# Patient Record
Sex: MALE | Race: WHITE | Employment: OTHER | URBAN - NONMETROPOLITAN AREA
[De-identification: names, ages, dates, MRNs, and addresses within clinical notes are randomized per-mention and may not be internally consistent; named-entity substitution may affect disease eponyms.]

---

## 2017-10-03 ENCOUNTER — DOCTOR'S OFFICE (OUTPATIENT)
Dept: URBAN - NONMETROPOLITAN AREA CLINIC 1 | Facility: CLINIC | Age: 72
Setting detail: OPHTHALMOLOGY
End: 2017-10-03
Payer: COMMERCIAL

## 2017-10-03 ENCOUNTER — RX ONLY (RX ONLY)
Age: 72
End: 2017-10-03

## 2017-10-03 DIAGNOSIS — H25.13: ICD-10-CM

## 2017-10-03 DIAGNOSIS — Z79.84: ICD-10-CM

## 2017-10-03 DIAGNOSIS — E11.3293: ICD-10-CM

## 2017-10-03 PROCEDURE — 92004 COMPRE OPH EXAM NEW PT 1/>: CPT | Performed by: OPHTHALMOLOGY

## 2017-10-03 ASSESSMENT — REFRACTION_MANIFEST
OD_VA3: 20/
OU_VA: 20/
OS_VA2: 20/
OS_VA3: 20/
OS_VA3: 20/
OD_VA1: 20/
OS_VA3: 20/
OD_VA1: 20/
OD_VA2: 20/
OD_VA1: 20/
OU_VA: 20/
OD_VA3: 20/
OD_VA2: 20/
OU_VA: 20/
OS_VA2: 20/
OD_VA2: 20/
OS_VA1: 20/
OS_VA1: 20/
OS_VA2: 20/
OS_VA1: 20/
OD_VA3: 20/

## 2017-10-03 ASSESSMENT — REFRACTION_AUTOREFRACTION
OS_CYLINDER: -0.50
OS_AXIS: 013
OD_AXIS: 114
OD_CYLINDER: -0.50
OS_SPHERE: -4.00
OD_SPHERE: -4.00

## 2017-10-03 ASSESSMENT — SPHEQUIV_DERIVED
OD_SPHEQUIV: -4.25
OS_SPHEQUIV: -4.25

## 2017-10-03 ASSESSMENT — VISUAL ACUITY
OS_BCVA: 20/25+1
OD_BCVA: 20/25-1

## 2017-10-03 ASSESSMENT — REFRACTION_CURRENTRX
OD_OVR_VA: 20/
OS_OVR_VA: 20/
OS_OVR_VA: 20/
OD_OVR_VA: 20/
OD_OVR_VA: 20/
OS_OVR_VA: 20/

## 2017-10-03 ASSESSMENT — CONFRONTATIONAL VISUAL FIELD TEST (CVF)
OS_FINDINGS: FULL
OD_FINDINGS: FULL

## 2017-10-17 ENCOUNTER — DOCTOR'S OFFICE (OUTPATIENT)
Dept: URBAN - NONMETROPOLITAN AREA CLINIC 1 | Facility: CLINIC | Age: 72
Setting detail: OPHTHALMOLOGY
End: 2017-10-17
Payer: COMMERCIAL

## 2017-10-17 ENCOUNTER — DOCTOR'S OFFICE (OUTPATIENT)
Dept: URBAN - NONMETROPOLITAN AREA CLINIC 1 | Facility: CLINIC | Age: 72
Setting detail: OPHTHALMOLOGY
End: 2017-10-17

## 2017-10-17 DIAGNOSIS — H52.4: ICD-10-CM

## 2017-10-17 DIAGNOSIS — H25.11: ICD-10-CM

## 2017-10-17 PROCEDURE — CATARACT K CATARACT KIT: Performed by: OPHTHALMOLOGY

## 2017-10-17 PROCEDURE — 92136 OPHTHALMIC BIOMETRY: CPT | Performed by: OPHTHALMOLOGY

## 2017-11-09 ENCOUNTER — AMBUL SURGICAL CARE (OUTPATIENT)
Dept: URBAN - NONMETROPOLITAN AREA SURGERY 1 | Facility: SURGERY | Age: 72
Setting detail: OPHTHALMOLOGY
End: 2017-11-09
Payer: COMMERCIAL

## 2017-11-09 DIAGNOSIS — H25.11: ICD-10-CM

## 2017-11-09 DIAGNOSIS — H25.011: ICD-10-CM

## 2017-11-09 DIAGNOSIS — H25.031: ICD-10-CM

## 2017-11-09 DIAGNOSIS — H25.041: ICD-10-CM

## 2017-11-09 PROCEDURE — 66984 XCAPSL CTRC RMVL W/O ECP: CPT | Performed by: OPHTHALMOLOGY

## 2017-11-09 PROCEDURE — G8918 PT W/O PREOP ORDER IV AB PRO: HCPCS | Performed by: OPHTHALMOLOGY

## 2017-11-09 PROCEDURE — G8907 PT DOC NO EVENTS ON DISCHARG: HCPCS | Performed by: OPHTHALMOLOGY

## 2017-11-10 ENCOUNTER — DOCTOR'S OFFICE (OUTPATIENT)
Dept: URBAN - NONMETROPOLITAN AREA CLINIC 1 | Facility: CLINIC | Age: 72
Setting detail: OPHTHALMOLOGY
End: 2017-11-10

## 2017-11-10 ENCOUNTER — RX ONLY (RX ONLY)
Age: 72
End: 2017-11-10

## 2017-11-10 DIAGNOSIS — H25.12: ICD-10-CM

## 2017-11-10 DIAGNOSIS — Z96.1: ICD-10-CM

## 2017-11-10 PROCEDURE — 99024 POSTOP FOLLOW-UP VISIT: CPT | Performed by: OPTOMETRIST

## 2017-11-10 ASSESSMENT — REFRACTION_CURRENTRX
OD_OVR_VA: 20/
OD_OVR_VA: 20/
OS_OVR_VA: 20/
OD_OVR_VA: 20/

## 2017-11-10 ASSESSMENT — REFRACTION_MANIFEST
OD_VA3: 20/
OU_VA: 20/
OS_VA1: 20/
OD_VA1: 20/
OD_VA3: 20/
OU_VA: 20/
OS_VA2: 20/
OD_VA1: 20/
OS_VA1: 20/
OS_VA3: 20/
OD_VA3: 20/
OD_VA2: 20/
OS_VA2: 20/
OS_VA2: 20/
OS_VA3: 20/
OS_VA1: 20/
OD_VA2: 20/
OU_VA: 20/
OD_VA2: 20/
OS_VA3: 20/
OD_VA1: 20/

## 2017-11-10 ASSESSMENT — SPHEQUIV_DERIVED
OD_SPHEQUIV: 0.25
OS_SPHEQUIV: -4.25

## 2017-11-10 ASSESSMENT — REFRACTION_AUTOREFRACTION
OS_CYLINDER: -0.50
OS_SPHERE: -4.00
OS_AXIS: 013
OD_SPHERE: +0.75
OD_AXIS: 170
OD_CYLINDER: -1.00

## 2017-11-10 ASSESSMENT — VISUAL ACUITY
OD_BCVA: 20/25+2
OS_BCVA: 20/20-1

## 2017-11-21 ENCOUNTER — DOCTOR'S OFFICE (OUTPATIENT)
Dept: URBAN - NONMETROPOLITAN AREA CLINIC 1 | Facility: CLINIC | Age: 72
Setting detail: OPHTHALMOLOGY
End: 2017-11-21

## 2017-11-21 DIAGNOSIS — Z96.1: ICD-10-CM

## 2017-11-21 DIAGNOSIS — H25.12: ICD-10-CM

## 2017-11-21 PROCEDURE — 99024 POSTOP FOLLOW-UP VISIT: CPT | Performed by: OPHTHALMOLOGY

## 2017-11-21 ASSESSMENT — REFRACTION_CURRENTRX
OD_OVR_VA: 20/
OS_OVR_VA: 20/
OD_OVR_VA: 20/
OD_OVR_VA: 20/
OS_OVR_VA: 20/
OS_OVR_VA: 20/

## 2017-11-21 ASSESSMENT — REFRACTION_MANIFEST
OS_VA3: 20/
OU_VA: 20/
OD_VA1: 20/
OU_VA: 20/
OD_VA3: 20/
OD_VA2: 20/
OU_VA: 20/
OD_VA3: 20/
OS_VA1: 20/
OS_VA3: 20/
OS_VA2: 20/
OD_VA3: 20/
OS_VA3: 20/
OD_VA1: 20/
OS_VA2: 20/
OD_VA1: 20/
OS_VA1: 20/
OD_VA2: 20/
OS_VA2: 20/
OD_VA2: 20/
OS_VA1: 20/

## 2017-11-21 ASSESSMENT — SPHEQUIV_DERIVED
OD_SPHEQUIV: -0.5
OS_SPHEQUIV: -4.875

## 2017-11-21 ASSESSMENT — REFRACTION_AUTOREFRACTION
OS_SPHERE: -4.25
OD_CYLINDER: 0.00
OD_AXIS: 0
OS_CYLINDER: -1.25
OS_AXIS: 37
OD_SPHERE: -0.50

## 2017-11-21 ASSESSMENT — VISUAL ACUITY
OS_BCVA: 20/25-2
OD_BCVA: 20/CF XS 6'

## 2017-11-28 ENCOUNTER — DOCTOR'S OFFICE (OUTPATIENT)
Dept: URBAN - NONMETROPOLITAN AREA CLINIC 1 | Facility: CLINIC | Age: 72
Setting detail: OPHTHALMOLOGY
End: 2017-11-28
Payer: COMMERCIAL

## 2017-11-28 DIAGNOSIS — H25.12: ICD-10-CM

## 2017-11-28 PROCEDURE — 92136 OPHTHALMIC BIOMETRY: CPT | Performed by: OPHTHALMOLOGY

## 2017-11-30 ENCOUNTER — AMBUL SURGICAL CARE (OUTPATIENT)
Dept: URBAN - NONMETROPOLITAN AREA SURGERY 1 | Facility: SURGERY | Age: 72
Setting detail: OPHTHALMOLOGY
End: 2017-11-30
Payer: COMMERCIAL

## 2017-11-30 DIAGNOSIS — H25.12: ICD-10-CM

## 2017-11-30 DIAGNOSIS — H25.042: ICD-10-CM

## 2017-11-30 DIAGNOSIS — H25.012: ICD-10-CM

## 2017-11-30 DIAGNOSIS — H25.032: ICD-10-CM

## 2017-11-30 PROCEDURE — 66984 XCAPSL CTRC RMVL W/O ECP: CPT | Performed by: OPHTHALMOLOGY

## 2017-11-30 PROCEDURE — G8907 PT DOC NO EVENTS ON DISCHARG: HCPCS | Performed by: OPHTHALMOLOGY

## 2017-11-30 PROCEDURE — G8918 PT W/O PREOP ORDER IV AB PRO: HCPCS | Performed by: OPHTHALMOLOGY

## 2017-12-01 ENCOUNTER — RX ONLY (RX ONLY)
Age: 72
End: 2017-12-01

## 2017-12-01 ENCOUNTER — DOCTOR'S OFFICE (OUTPATIENT)
Dept: URBAN - NONMETROPOLITAN AREA CLINIC 1 | Facility: CLINIC | Age: 72
Setting detail: OPHTHALMOLOGY
End: 2017-12-01

## 2017-12-01 DIAGNOSIS — Z96.1: ICD-10-CM

## 2017-12-01 PROBLEM — H25.12 CATARACT NUCLEAR SCLEROSIS AGE RELATED; LEFT EYE: Status: RESOLVED | Noted: 2017-11-10 | Resolved: 2017-12-01

## 2017-12-01 PROCEDURE — 99024 POSTOP FOLLOW-UP VISIT: CPT | Performed by: OPTOMETRIST

## 2017-12-01 ASSESSMENT — REFRACTION_AUTOREFRACTION
OS_AXIS: 151
OD_AXIS: 167
OD_SPHERE: -0.50
OD_CYLINDER: -0.50
OS_SPHERE: -0.25
OS_CYLINDER: -0.50

## 2017-12-01 ASSESSMENT — REFRACTION_MANIFEST
OD_VA1: 20/
OD_VA2: 20/
OS_VA2: 20/
OS_VA2: 20/
OU_VA: 20/
OD_VA3: 20/
OD_VA1: 20/
OS_VA1: 20/
OD_VA3: 20/
OS_VA3: 20/
OD_VA2: 20/
OS_VA3: 20/
OD_VA1: 20/
OD_VA3: 20/
OU_VA: 20/
OU_VA: 20/
OS_VA3: 20/
OD_VA2: 20/
OS_VA1: 20/
OS_VA1: 20/
OS_VA2: 20/

## 2017-12-01 ASSESSMENT — SPHEQUIV_DERIVED
OS_SPHEQUIV: -0.5
OD_SPHEQUIV: -0.75

## 2017-12-01 ASSESSMENT — REFRACTION_CURRENTRX
OD_OVR_VA: 20/
OD_OVR_VA: 20/
OS_OVR_VA: 20/
OD_OVR_VA: 20/

## 2017-12-01 ASSESSMENT — VISUAL ACUITY
OD_BCVA: 20/25-2
OS_BCVA: 20/25-2

## 2017-12-13 ENCOUNTER — DOCTOR'S OFFICE (OUTPATIENT)
Dept: URBAN - NONMETROPOLITAN AREA CLINIC 1 | Facility: CLINIC | Age: 72
Setting detail: OPHTHALMOLOGY
End: 2017-12-13

## 2017-12-13 ENCOUNTER — RX ONLY (RX ONLY)
Age: 72
End: 2017-12-13

## 2017-12-13 DIAGNOSIS — Z96.1: ICD-10-CM

## 2017-12-13 PROCEDURE — 99024 POSTOP FOLLOW-UP VISIT: CPT | Performed by: PHYSICIAN ASSISTANT

## 2018-01-23 ENCOUNTER — RX ONLY (RX ONLY)
Age: 73
End: 2018-01-23

## 2018-01-23 ENCOUNTER — DOCTOR'S OFFICE (OUTPATIENT)
Dept: URBAN - NONMETROPOLITAN AREA CLINIC 1 | Facility: CLINIC | Age: 73
Setting detail: OPHTHALMOLOGY
End: 2018-01-23
Payer: COMMERCIAL

## 2018-01-23 DIAGNOSIS — H04.123: ICD-10-CM

## 2018-01-23 DIAGNOSIS — Z96.1: ICD-10-CM

## 2018-01-23 PROCEDURE — 92015 DETERMINE REFRACTIVE STATE: CPT | Performed by: OPTOMETRIST

## 2018-01-23 PROCEDURE — 83861 MICROFLUID ANALY TEARS: CPT | Performed by: OPTOMETRIST

## 2018-01-23 ASSESSMENT — REFRACTION_MANIFEST
OD_VA2: 20/
OD_VA3: 20/
OS_VA1: 20/
OD_VA1: 20/
OU_VA: 20/
OS_VA2: 20/
OS_VA1: 20/
OD_VA2: 20/
OS_VA3: 20/
OS_VA3: 20/
OS_VA2: 20/
OD_VA1: 20/
OU_VA: 20/
OD_VA3: 20/

## 2018-01-23 ASSESSMENT — REFRACTION_CURRENTRX
OS_OVR_VA: 20/
OS_OVR_VA: 20/
OD_OVR_VA: 20/
OD_OVR_VA: 20/
OS_OVR_VA: 20/
OD_OVR_VA: 20/

## 2018-01-23 ASSESSMENT — VISUAL ACUITY
OD_BCVA: 20/20-1
OS_BCVA: 20/25-2

## 2018-01-23 ASSESSMENT — REFRACTION_OUTSIDERX
OD_CYLINDER: SPH
OD_VA1: 20/20-1
OD_VA2: 20/20-1
OS_CYLINDER: -0.50
OD_SPHERE: PLANO
OS_VA2: 20/20-2
OS_SPHERE: -0.25
OD_ADD: +2.50
OS_VA3: 20/
OD_VA3: 20/
OU_VA: 20/
OS_ADD: +2.50
OS_VA1: 20/20-2
OS_AXIS: 155

## 2018-01-23 ASSESSMENT — SPHEQUIV_DERIVED
OD_SPHEQUIV: -0.5
OS_SPHEQUIV: -0.875

## 2018-01-23 ASSESSMENT — REFRACTION_AUTOREFRACTION
OD_SPHERE: -0.25
OS_SPHERE: -0.25
OS_CYLINDER: -1.25
OD_CYLINDER: -0.50
OD_AXIS: 073
OS_AXIS: 156

## 2018-02-08 ENCOUNTER — OPTICAL (OUTPATIENT)
Dept: URBAN - NONMETROPOLITAN AREA CLINIC 6 | Facility: CLINIC | Age: 73
Setting detail: OPHTHALMOLOGY
End: 2018-02-08
Payer: COMMERCIAL

## 2018-02-08 DIAGNOSIS — Z96.1: ICD-10-CM

## 2018-02-08 PROCEDURE — V2020 VISION SVCS FRAMES PURCHASES: HCPCS | Performed by: OPTOMETRIST

## 2018-02-08 PROCEDURE — V2100 LENS SPHER SINGLE PLANO 4.00: HCPCS | Performed by: OPTOMETRIST

## 2018-02-08 PROCEDURE — V2103 SPHEROCYLINDR 4.00D/12-2.00D: HCPCS | Performed by: OPTOMETRIST

## 2018-03-09 ASSESSMENT — REFRACTION_MANIFEST
OU_VA: 20/
OD_VA2: 20/
OS_VA3: 20/
OD_VA2: 20/
OD_VA1: 20/
OU_VA: 20/
OD_VA3: 20/
OD_VA1: 20/
OD_VA3: 20/
OD_VA2: 20/
OU_VA: 20/
OS_VA2: 20/
OD_VA3: 20/
OS_VA3: 20/
OS_VA1: 20/
OS_VA1: 20/
OS_VA2: 20/
OS_VA2: 20/
OS_VA1: 20/
OS_VA3: 20/
OD_VA1: 20/

## 2018-03-09 ASSESSMENT — REFRACTION_AUTOREFRACTION
OS_SPHERE: +0.25
OD_CYLINDER: -1.00
OD_SPHERE: +1.75
OD_AXIS: 84
OS_CYLINDER: -0.50
OS_AXIS: 165

## 2018-03-09 ASSESSMENT — REFRACTION_CURRENTRX
OD_OVR_VA: 20/
OD_OVR_VA: 20/
OS_OVR_VA: 20/
OS_OVR_VA: 20/
OD_OVR_VA: 20/
OS_OVR_VA: 20/

## 2018-03-09 ASSESSMENT — SPHEQUIV_DERIVED
OD_SPHEQUIV: 1.25
OS_SPHEQUIV: 0

## 2018-03-09 ASSESSMENT — VISUAL ACUITY
OD_BCVA: 20/20
OS_BCVA: 20/25+2

## 2018-07-24 ENCOUNTER — RX ONLY (RX ONLY)
Age: 73
End: 2018-07-24

## 2018-07-24 ENCOUNTER — DOCTOR'S OFFICE (OUTPATIENT)
Dept: URBAN - NONMETROPOLITAN AREA CLINIC 1 | Facility: CLINIC | Age: 73
Setting detail: OPHTHALMOLOGY
End: 2018-07-24
Payer: COMMERCIAL

## 2018-07-24 DIAGNOSIS — Z96.1: ICD-10-CM

## 2018-07-24 DIAGNOSIS — H04.123: ICD-10-CM

## 2018-07-24 DIAGNOSIS — H04.122: ICD-10-CM

## 2018-07-24 DIAGNOSIS — Z79.84: ICD-10-CM

## 2018-07-24 DIAGNOSIS — E11.3293: ICD-10-CM

## 2018-07-24 DIAGNOSIS — H04.121: ICD-10-CM

## 2018-07-24 DIAGNOSIS — H35.373: ICD-10-CM

## 2018-07-24 PROCEDURE — 83861 MICROFLUID ANALY TEARS: CPT | Performed by: OPHTHALMOLOGY

## 2018-07-24 PROCEDURE — 92134 CPTRZ OPH DX IMG PST SGM RTA: CPT | Performed by: OPHTHALMOLOGY

## 2018-07-24 PROCEDURE — 92014 COMPRE OPH EXAM EST PT 1/>: CPT | Performed by: OPHTHALMOLOGY

## 2018-07-24 ASSESSMENT — REFRACTION_CURRENTRX
OS_AXIS: 149
OS_SPHERE: +2.25
OS_CYLINDER: -0.50
OD_AXIS: 180
OS_OVR_VA: 20/
OS_OVR_VA: 20/
OD_OVR_VA: 20/
OD_OVR_VA: 20/
OD_SPHERE: +2.50
OD_OVR_VA: 20/
OD_CYLINDER: 0.00
OS_OVR_VA: 20/

## 2018-07-24 ASSESSMENT — REFRACTION_MANIFEST
OD_VA3: 20/
OS_VA2: 20/
OD_VA2: 20/
OS_VA2: 20/
OU_VA: 20/
OD_VA3: 20/
OD_VA1: 20/
OU_VA: 20/
OS_VA1: 20/
OS_VA3: 20/
OD_VA1: 20/
OD_VA2: 20/
OS_VA3: 20/
OS_VA1: 20/

## 2018-07-24 ASSESSMENT — VISUAL ACUITY
OS_BCVA: 20/25-1
OD_BCVA: 20/25

## 2018-07-24 ASSESSMENT — REFRACTION_AUTOREFRACTION
OD_AXIS: 141
OD_CYLINDER: -0.75
OS_AXIS: 180
OS_CYLINDER: 0.00
OS_SPHERE: +0.50
OD_SPHERE: +0.75

## 2018-07-24 ASSESSMENT — REFRACTION_OUTSIDERX
OS_SPHERE: -0.25
OD_ADD: +2.50
OU_VA: 20/
OS_ADD: +2.50
OS_VA1: 20/20-2
OS_VA2: 20/20-2
OS_VA3: 20/
OD_CYLINDER: SPH
OD_VA2: 20/20-1
OD_VA1: 20/20-1
OD_SPHERE: PLANO
OS_AXIS: 155
OD_VA3: 20/
OS_CYLINDER: -0.50

## 2018-07-24 ASSESSMENT — CONFRONTATIONAL VISUAL FIELD TEST (CVF)
OS_FINDINGS: FULL
OD_FINDINGS: FULL

## 2018-07-24 ASSESSMENT — SPHEQUIV_DERIVED
OS_SPHEQUIV: 0.5
OD_SPHEQUIV: 0.375

## 2019-08-28 ENCOUNTER — DOCTOR'S OFFICE (OUTPATIENT)
Dept: URBAN - NONMETROPOLITAN AREA CLINIC 1 | Facility: CLINIC | Age: 74
Setting detail: OPHTHALMOLOGY
End: 2019-08-28
Payer: COMMERCIAL

## 2019-08-28 DIAGNOSIS — Z79.84: ICD-10-CM

## 2019-08-28 DIAGNOSIS — E11.3293: ICD-10-CM

## 2019-08-28 DIAGNOSIS — H35.373: ICD-10-CM

## 2019-08-28 DIAGNOSIS — H04.122: ICD-10-CM

## 2019-08-28 DIAGNOSIS — H04.123: ICD-10-CM

## 2019-08-28 DIAGNOSIS — H04.121: ICD-10-CM

## 2019-08-28 PROCEDURE — 92134 CPTRZ OPH DX IMG PST SGM RTA: CPT | Performed by: OPHTHALMOLOGY

## 2019-08-28 PROCEDURE — 92014 COMPRE OPH EXAM EST PT 1/>: CPT | Performed by: OPHTHALMOLOGY

## 2019-08-28 PROCEDURE — 83861 MICROFLUID ANALY TEARS: CPT | Performed by: OPHTHALMOLOGY

## 2019-08-28 ASSESSMENT — REFRACTION_AUTOREFRACTION
OD_AXIS: 141
OS_CYLINDER: 0.00
OD_CYLINDER: -0.75
OS_AXIS: 180
OS_SPHERE: +0.50
OD_SPHERE: +0.75

## 2019-08-28 ASSESSMENT — REFRACTION_MANIFEST
OS_AXIS: 155
OS_VA3: 20/
OS_VA3: 20/
OD_VA2: 20/
OS_VA2: 20/20-2
OS_ADD: +2.50
OS_VA1: 20/20-2
OD_VA3: 20/
OD_ADD: +2.50
OD_VA1: 20/
OS_SPHERE: -0.25
OD_VA2: 20/20-1
OS_VA1: 20/
OU_VA: 20/
OD_VA1: 20/20-1
OD_VA3: 20/
OU_VA: 20/
OD_SPHERE: PLANO
OS_VA2: 20/
OS_CYLINDER: -0.50
OD_CYLINDER: SPH

## 2019-08-28 ASSESSMENT — CONFRONTATIONAL VISUAL FIELD TEST (CVF)
OD_FINDINGS: FULL
OS_FINDINGS: FULL

## 2019-08-28 ASSESSMENT — REFRACTION_CURRENTRX
OD_SPHERE: +2.50
OS_AXIS: 149
OD_OVR_VA: 20/
OD_OVR_VA: 20/
OS_OVR_VA: 20/
OD_OVR_VA: 20/
OS_CYLINDER: -0.50
OS_SPHERE: +2.25
OD_CYLINDER: 0.00
OD_AXIS: 180
OS_OVR_VA: 20/
OS_OVR_VA: 20/

## 2019-08-28 ASSESSMENT — VISUAL ACUITY
OS_BCVA: 20/20-2
OD_BCVA: 20/20-2

## 2019-08-28 ASSESSMENT — SPHEQUIV_DERIVED
OS_SPHEQUIV: 0.5
OS_SPHEQUIV: -0.5
OD_SPHEQUIV: 0.375

## 2020-02-28 ENCOUNTER — DOCTOR'S OFFICE (OUTPATIENT)
Dept: URBAN - NONMETROPOLITAN AREA CLINIC 1 | Facility: CLINIC | Age: 75
Setting detail: OPHTHALMOLOGY
End: 2020-02-28
Payer: COMMERCIAL

## 2020-02-28 VITALS — HEIGHT: 60 IN

## 2020-02-28 DIAGNOSIS — H04.121: ICD-10-CM

## 2020-02-28 DIAGNOSIS — H35.373: ICD-10-CM

## 2020-02-28 DIAGNOSIS — Z79.84: ICD-10-CM

## 2020-02-28 DIAGNOSIS — H04.122: ICD-10-CM

## 2020-02-28 DIAGNOSIS — E11.3293: ICD-10-CM

## 2020-02-28 DIAGNOSIS — Z96.1: ICD-10-CM

## 2020-02-28 PROCEDURE — 92014 COMPRE OPH EXAM EST PT 1/>: CPT | Performed by: OPHTHALMOLOGY

## 2020-02-28 PROCEDURE — 92134 CPTRZ OPH DX IMG PST SGM RTA: CPT | Performed by: OPHTHALMOLOGY

## 2020-02-28 PROCEDURE — 83861 MICROFLUID ANALY TEARS: CPT | Performed by: OPHTHALMOLOGY

## 2020-02-28 ASSESSMENT — CONFRONTATIONAL VISUAL FIELD TEST (CVF)
OD_FINDINGS: FULL
OS_FINDINGS: FULL

## 2020-03-02 ASSESSMENT — REFRACTION_CURRENTRX
OS_OVR_VA: 20/
OS_AXIS: 149
OD_OVR_VA: 20/
OS_CYLINDER: -0.50
OD_CYLINDER: 0.00
OD_SPHERE: +2.50
OS_SPHERE: +2.25
OD_AXIS: 180

## 2020-03-02 ASSESSMENT — REFRACTION_AUTOREFRACTION
OD_SPHERE: +0.50
OS_CYLINDER: -0.50
OS_AXIS: 166
OD_AXIS: 168
OD_CYLINDER: -1.50
OS_SPHERE: +0.75

## 2020-03-02 ASSESSMENT — REFRACTION_MANIFEST
OS_VA1: 20/20-2
OD_VA1: 20/20-1
OS_VA2: 20/20-2
OS_CYLINDER: -0.50
OS_ADD: +2.50
OD_SPHERE: PLANO
OD_ADD: +2.50
OS_AXIS: 155
OD_CYLINDER: SPH
OD_VA2: 20/20-1
OS_SPHERE: -0.25

## 2020-03-02 ASSESSMENT — SPHEQUIV_DERIVED
OD_SPHEQUIV: -0.25
OS_SPHEQUIV: -0.5
OS_SPHEQUIV: 0.5

## 2020-03-02 ASSESSMENT — VISUAL ACUITY
OS_BCVA: 20/20-2
OD_BCVA: 20/201

## 2021-03-23 ENCOUNTER — DOCTOR'S OFFICE (OUTPATIENT)
Dept: URBAN - NONMETROPOLITAN AREA CLINIC 1 | Facility: CLINIC | Age: 76
Setting detail: OPHTHALMOLOGY
End: 2021-03-23
Payer: COMMERCIAL

## 2021-03-23 VITALS — HEIGHT: 60 IN

## 2021-03-23 DIAGNOSIS — H04.121: ICD-10-CM

## 2021-03-23 DIAGNOSIS — E11.3293: ICD-10-CM

## 2021-03-23 DIAGNOSIS — H04.122: ICD-10-CM

## 2021-03-23 DIAGNOSIS — Z79.84: ICD-10-CM

## 2021-03-23 DIAGNOSIS — Z96.1: ICD-10-CM

## 2021-03-23 DIAGNOSIS — H35.373: ICD-10-CM

## 2021-03-23 PROCEDURE — 83861 MICROFLUID ANALY TEARS: CPT | Performed by: OPHTHALMOLOGY

## 2021-03-23 PROCEDURE — 92014 COMPRE OPH EXAM EST PT 1/>: CPT | Performed by: OPHTHALMOLOGY

## 2021-03-23 PROCEDURE — 92134 CPTRZ OPH DX IMG PST SGM RTA: CPT | Performed by: OPHTHALMOLOGY

## 2021-03-23 ASSESSMENT — REFRACTION_CURRENTRX
OD_CYLINDER: 0.00
OS_AXIS: 149
OS_SPHERE: +2.25
OD_AXIS: 180
OS_CYLINDER: -0.50
OD_SPHERE: +2.50
OS_OVR_VA: 20/
OD_OVR_VA: 20/

## 2021-03-23 ASSESSMENT — VISUAL ACUITY
OS_BCVA: 20/20-1
OD_BCVA: 20/20-1

## 2021-03-23 ASSESSMENT — REFRACTION_MANIFEST
OD_VA2: 20/20-1
OD_SPHERE: PLANO
OS_SPHERE: -0.25
OS_VA1: 20/20-2
OD_VA1: 20/20-1
OS_AXIS: 155
OS_VA2: 20/20-2
OS_ADD: +2.50
OD_CYLINDER: SPH
OD_ADD: +2.50
OS_CYLINDER: -0.50

## 2021-03-23 ASSESSMENT — REFRACTION_AUTOREFRACTION
OD_CYLINDER: -1.50
OS_CYLINDER: -0.50
OD_AXIS: 168
OS_AXIS: 166
OD_SPHERE: +0.50
OS_SPHERE: +0.75

## 2021-03-23 ASSESSMENT — SPHEQUIV_DERIVED
OS_SPHEQUIV: -0.5
OS_SPHEQUIV: 0.5
OD_SPHEQUIV: -0.25

## 2021-03-23 ASSESSMENT — MACULA - EPIRETINAL MEMBRANE (ERM)
OS_ERM: T
OD_ERM: T

## 2021-03-23 ASSESSMENT — CONFRONTATIONAL VISUAL FIELD TEST (CVF)
OD_FINDINGS: FULL
OS_FINDINGS: FULL

## 2021-09-28 ENCOUNTER — RX ONLY (RX ONLY)
Age: 76
End: 2021-09-28

## 2021-09-28 ENCOUNTER — DOCTOR'S OFFICE (OUTPATIENT)
Dept: URBAN - NONMETROPOLITAN AREA CLINIC 1 | Facility: CLINIC | Age: 76
Setting detail: OPHTHALMOLOGY
End: 2021-09-28
Payer: COMMERCIAL

## 2021-09-28 VITALS — HEIGHT: 60 IN

## 2021-09-28 DIAGNOSIS — E11.3293: ICD-10-CM

## 2021-09-28 DIAGNOSIS — Z79.84: ICD-10-CM

## 2021-09-28 DIAGNOSIS — Z96.1: ICD-10-CM

## 2021-09-28 DIAGNOSIS — H26.492: ICD-10-CM

## 2021-09-28 DIAGNOSIS — H35.373: ICD-10-CM

## 2021-09-28 DIAGNOSIS — H04.121: ICD-10-CM

## 2021-09-28 DIAGNOSIS — H04.122: ICD-10-CM

## 2021-09-28 PROCEDURE — 83861 MICROFLUID ANALY TEARS: CPT | Performed by: OPHTHALMOLOGY

## 2021-09-28 PROCEDURE — 92134 CPTRZ OPH DX IMG PST SGM RTA: CPT | Performed by: OPHTHALMOLOGY

## 2021-09-28 PROCEDURE — 92014 COMPRE OPH EXAM EST PT 1/>: CPT | Performed by: OPHTHALMOLOGY

## 2021-09-28 ASSESSMENT — CONFRONTATIONAL VISUAL FIELD TEST (CVF)
OD_FINDINGS: FULL
OS_FINDINGS: FULL

## 2021-09-28 ASSESSMENT — MACULA - EPIRETINAL MEMBRANE (ERM)
OD_ERM: T
OS_ERM: T

## 2021-09-29 ASSESSMENT — REFRACTION_MANIFEST
OD_CYLINDER: SPH
OS_VA2: 20/20-2
OS_VA1: 20/20-2
OD_SPHERE: PLANO
OD_ADD: +2.50
OD_VA2: 20/20-1
OS_CYLINDER: -0.50
OS_SPHERE: -0.25
OS_AXIS: 155
OS_ADD: +2.50
OD_VA1: 20/20-1

## 2021-09-29 ASSESSMENT — REFRACTION_AUTOREFRACTION
OS_CYLINDER: -0.50
OD_AXIS: 168
OS_AXIS: 166
OS_SPHERE: +0.75
OD_CYLINDER: -1.50
OD_SPHERE: +0.50

## 2021-09-29 ASSESSMENT — REFRACTION_CURRENTRX
OD_AXIS: 180
OD_SPHERE: +2.50
OS_OVR_VA: 20/
OD_CYLINDER: 0.00
OD_OVR_VA: 20/
OS_SPHERE: +2.25
OS_CYLINDER: -0.50
OS_AXIS: 149

## 2021-09-29 ASSESSMENT — VISUAL ACUITY
OS_BCVA: 20/25-1
OD_BCVA: 20/40+2

## 2021-09-29 ASSESSMENT — SPHEQUIV_DERIVED
OS_SPHEQUIV: 0.5
OS_SPHEQUIV: -0.5
OD_SPHEQUIV: -0.25

## 2021-10-04 ENCOUNTER — CONSULT (OUTPATIENT)
Dept: PAIN MEDICINE | Facility: CLINIC | Age: 76
End: 2021-10-04
Payer: MEDICARE

## 2021-10-04 ENCOUNTER — HOSPITAL ENCOUNTER (OUTPATIENT)
Dept: RADIOLOGY | Facility: CLINIC | Age: 76
Discharge: HOME | End: 2021-10-04

## 2021-10-04 VITALS
WEIGHT: 199.8 LBS | RESPIRATION RATE: 20 BRPM | HEART RATE: 78 BPM | TEMPERATURE: 97.8 F | SYSTOLIC BLOOD PRESSURE: 140 MMHG | HEIGHT: 71 IN | BODY MASS INDEX: 27.97 KG/M2 | DIASTOLIC BLOOD PRESSURE: 78 MMHG

## 2021-10-04 DIAGNOSIS — M48.062 SPINAL STENOSIS OF LUMBAR REGION WITH NEUROGENIC CLAUDICATION: ICD-10-CM

## 2021-10-04 DIAGNOSIS — M54.16 LUMBAR RADICULOPATHY: ICD-10-CM

## 2021-10-04 DIAGNOSIS — M54.16 LUMBAR RADICULOPATHY: Primary | ICD-10-CM

## 2021-10-04 PROCEDURE — 99204 OFFICE O/P NEW MOD 45 MIN: CPT | Performed by: ANESTHESIOLOGY

## 2021-10-04 RX ORDER — HYDROCHLOROTHIAZIDE 25 MG/1
TABLET ORAL DAILY
COMMUNITY
Start: 2021-08-16

## 2021-10-04 RX ORDER — TADALAFIL 20 MG/1
20 TABLET ORAL AS NEEDED
COMMUNITY
Start: 2021-08-16

## 2021-10-04 RX ORDER — LISINOPRIL 10 MG/1
TABLET ORAL
COMMUNITY
End: 2022-02-28

## 2021-10-04 RX ORDER — ASPIRIN 81 MG/1
81 TABLET ORAL DAILY
COMMUNITY
End: 2022-02-04 | Stop reason: HOSPADM

## 2021-10-04 RX ORDER — LISINOPRIL 40 MG/1
40 TABLET ORAL DAILY
COMMUNITY
Start: 2020-12-03 | End: 2022-02-28

## 2021-10-04 RX ORDER — MELOXICAM 7.5 MG/1
7.5 TABLET ORAL DAILY
Qty: 30 TABLET | Refills: 0 | Status: SHIPPED | OUTPATIENT
Start: 2021-10-04 | End: 2021-11-08

## 2021-10-04 RX ORDER — GABAPENTIN 300 MG/1
300 CAPSULE ORAL 3 TIMES DAILY
Qty: 90 CAPSULE | Refills: 0 | Status: SHIPPED | OUTPATIENT
Start: 2021-10-04 | End: 2022-01-19

## 2021-10-12 ENCOUNTER — HOSPITAL ENCOUNTER (OUTPATIENT)
Dept: MRI IMAGING | Facility: HOSPITAL | Age: 76
Discharge: HOME | End: 2021-10-12
Attending: ANESTHESIOLOGY
Payer: MEDICARE

## 2021-10-12 DIAGNOSIS — M54.16 LUMBAR RADICULOPATHY: ICD-10-CM

## 2021-10-12 PROCEDURE — G1004 CDSM NDSC: HCPCS

## 2021-10-12 PROCEDURE — 72148 MRI LUMBAR SPINE W/O DYE: CPT

## 2021-10-15 ENCOUNTER — TELEPHONE (OUTPATIENT)
Dept: PAIN MEDICINE | Facility: CLINIC | Age: 76
End: 2021-10-15

## 2021-10-15 ENCOUNTER — PREP FOR PROCEDURE (OUTPATIENT)
Dept: PAIN MEDICINE | Facility: CLINIC | Age: 76
End: 2021-10-15

## 2021-10-15 DIAGNOSIS — M54.16 LUMBAR RADICULOPATHY: Primary | ICD-10-CM

## 2021-10-19 ENCOUNTER — APPOINTMENT (OUTPATIENT)
Dept: RADIOLOGY | Facility: HOSPITAL | Age: 76
End: 2021-10-19
Payer: MEDICARE

## 2021-10-19 ENCOUNTER — HOSPITAL ENCOUNTER (OUTPATIENT)
Facility: HOSPITAL | Age: 76
Setting detail: OUTPATIENT SURGERY
Discharge: HOME | End: 2021-10-19
Attending: ANESTHESIOLOGY | Admitting: ANESTHESIOLOGY
Payer: MEDICARE

## 2021-10-19 VITALS
OXYGEN SATURATION: 98 % | HEIGHT: 71 IN | RESPIRATION RATE: 18 BRPM | HEART RATE: 60 BPM | BODY MASS INDEX: 27.86 KG/M2 | DIASTOLIC BLOOD PRESSURE: 78 MMHG | WEIGHT: 199 LBS | SYSTOLIC BLOOD PRESSURE: 171 MMHG | TEMPERATURE: 98.9 F

## 2021-10-19 LAB — GLUCOSE SERPL-MCNC: 109 MG/DL (ref 65–140)

## 2021-10-19 PROCEDURE — 82948 REAGENT STRIP/BLOOD GLUCOSE: CPT

## 2021-10-19 PROCEDURE — 62323 NJX INTERLAMINAR LMBR/SAC: CPT | Performed by: ANESTHESIOLOGY

## 2021-10-19 RX ORDER — METHYLPREDNISOLONE ACETATE 80 MG/ML
INJECTION, SUSPENSION INTRA-ARTICULAR; INTRALESIONAL; INTRAMUSCULAR; SOFT TISSUE AS NEEDED
Status: DISCONTINUED | OUTPATIENT
Start: 2021-10-19 | End: 2021-10-19 | Stop reason: HOSPADM

## 2021-10-19 RX ORDER — SODIUM CHLORIDE 9 MG/ML
INJECTION INTRAVENOUS AS NEEDED
Status: DISCONTINUED | OUTPATIENT
Start: 2021-10-19 | End: 2021-10-19 | Stop reason: HOSPADM

## 2021-10-19 RX ORDER — LIDOCAINE HYDROCHLORIDE 10 MG/ML
INJECTION, SOLUTION EPIDURAL; INFILTRATION; INTRACAUDAL; PERINEURAL AS NEEDED
Status: DISCONTINUED | OUTPATIENT
Start: 2021-10-19 | End: 2021-10-19 | Stop reason: HOSPADM

## 2021-10-26 ENCOUNTER — TELEPHONE (OUTPATIENT)
Dept: PAIN MEDICINE | Facility: CLINIC | Age: 76
End: 2021-10-26

## 2021-10-26 ENCOUNTER — AMBUL SURGICAL CARE (OUTPATIENT)
Dept: URBAN - NONMETROPOLITAN AREA SURGERY 1 | Facility: SURGERY | Age: 76
Setting detail: OPHTHALMOLOGY
End: 2021-10-26
Payer: COMMERCIAL

## 2021-10-26 DIAGNOSIS — H26.492: ICD-10-CM

## 2021-10-26 PROCEDURE — G8907 PT DOC NO EVENTS ON DISCHARG: HCPCS | Performed by: CLINIC/CENTER

## 2021-10-26 PROCEDURE — G8918 PT W/O PREOP ORDER IV AB PRO: HCPCS | Performed by: OPHTHALMOLOGY

## 2021-10-26 PROCEDURE — G8918 PT W/O PREOP ORDER IV AB PRO: HCPCS | Performed by: CLINIC/CENTER

## 2021-10-26 PROCEDURE — G8907 PT DOC NO EVENTS ON DISCHARG: HCPCS | Performed by: OPHTHALMOLOGY

## 2021-10-26 PROCEDURE — 66821 AFTER CATARACT LASER SURGERY: CPT | Performed by: OPHTHALMOLOGY

## 2021-10-26 PROCEDURE — 66821 AFTER CATARACT LASER SURGERY: CPT | Performed by: CLINIC/CENTER

## 2021-11-08 ENCOUNTER — OFFICE VISIT (OUTPATIENT)
Dept: PAIN MEDICINE | Facility: CLINIC | Age: 76
End: 2021-11-08
Payer: MEDICARE

## 2021-11-08 VITALS
HEART RATE: 58 BPM | HEIGHT: 71 IN | WEIGHT: 200.8 LBS | SYSTOLIC BLOOD PRESSURE: 126 MMHG | BODY MASS INDEX: 28.11 KG/M2 | DIASTOLIC BLOOD PRESSURE: 70 MMHG

## 2021-11-08 DIAGNOSIS — M79.2 NEURALGIA AND NEURITIS: Primary | ICD-10-CM

## 2021-11-08 DIAGNOSIS — M17.11 PRIMARY OSTEOARTHRITIS OF RIGHT KNEE: ICD-10-CM

## 2021-11-08 PROCEDURE — 99214 OFFICE O/P EST MOD 30 MIN: CPT | Performed by: ANESTHESIOLOGY

## 2021-11-08 RX ORDER — BUPIVACAINE HCL/PF 2.5 MG/ML
10 VIAL (ML) INJECTION ONCE
Status: CANCELLED | OUTPATIENT
Start: 2021-11-08 | End: 2021-11-08

## 2021-11-08 RX ORDER — LIDOCAINE HYDROCHLORIDE 10 MG/ML
10 INJECTION, SOLUTION EPIDURAL; INFILTRATION; INTRACAUDAL; PERINEURAL ONCE
Status: CANCELLED | OUTPATIENT
Start: 2021-11-08 | End: 2021-11-08

## 2021-11-08 RX ORDER — METHYLPREDNISOLONE ACETATE 80 MG/ML
80 INJECTION, SUSPENSION INTRA-ARTICULAR; INTRALESIONAL; INTRAMUSCULAR; PARENTERAL; SOFT TISSUE ONCE
Status: CANCELLED | OUTPATIENT
Start: 2021-11-08 | End: 2021-11-08

## 2021-11-11 ENCOUNTER — HOSPITAL ENCOUNTER (OUTPATIENT)
Facility: HOSPITAL | Age: 76
Setting detail: OUTPATIENT SURGERY
Discharge: HOME | End: 2021-11-11
Attending: ANESTHESIOLOGY | Admitting: ANESTHESIOLOGY
Payer: MEDICARE

## 2021-11-11 ENCOUNTER — APPOINTMENT (OUTPATIENT)
Dept: RADIOLOGY | Facility: HOSPITAL | Age: 76
End: 2021-11-11
Payer: MEDICARE

## 2021-11-11 VITALS
RESPIRATION RATE: 18 BRPM | BODY MASS INDEX: 28 KG/M2 | DIASTOLIC BLOOD PRESSURE: 63 MMHG | WEIGHT: 200 LBS | HEART RATE: 64 BPM | HEIGHT: 71 IN | OXYGEN SATURATION: 98 % | TEMPERATURE: 98.3 F | SYSTOLIC BLOOD PRESSURE: 137 MMHG

## 2021-11-11 PROCEDURE — 62323 NJX INTERLAMINAR LMBR/SAC: CPT | Performed by: ANESTHESIOLOGY

## 2021-11-11 RX ORDER — METHYLPREDNISOLONE ACETATE 80 MG/ML
INJECTION, SUSPENSION INTRA-ARTICULAR; INTRALESIONAL; INTRAMUSCULAR; SOFT TISSUE AS NEEDED
Status: DISCONTINUED | OUTPATIENT
Start: 2021-11-11 | End: 2021-11-11 | Stop reason: HOSPADM

## 2021-11-11 RX ORDER — LIDOCAINE HYDROCHLORIDE 10 MG/ML
INJECTION, SOLUTION EPIDURAL; INFILTRATION; INTRACAUDAL; PERINEURAL AS NEEDED
Status: DISCONTINUED | OUTPATIENT
Start: 2021-11-11 | End: 2021-11-11 | Stop reason: HOSPADM

## 2021-11-11 RX ORDER — SODIUM CHLORIDE 9 MG/ML
INJECTION INTRAVENOUS AS NEEDED
Status: DISCONTINUED | OUTPATIENT
Start: 2021-11-11 | End: 2021-11-11 | Stop reason: HOSPADM

## 2021-11-18 ENCOUNTER — TELEPHONE (OUTPATIENT)
Dept: PAIN MEDICINE | Facility: CLINIC | Age: 76
End: 2021-11-18

## 2021-11-29 ENCOUNTER — OFFICE VISIT (OUTPATIENT)
Dept: PAIN MEDICINE | Facility: CLINIC | Age: 76
End: 2021-11-29
Payer: MEDICARE

## 2021-11-29 VITALS
BODY MASS INDEX: 28.06 KG/M2 | DIASTOLIC BLOOD PRESSURE: 60 MMHG | WEIGHT: 200.4 LBS | RESPIRATION RATE: 20 BRPM | SYSTOLIC BLOOD PRESSURE: 142 MMHG | TEMPERATURE: 96.2 F | HEART RATE: 79 BPM | HEIGHT: 71 IN

## 2021-11-29 DIAGNOSIS — M17.11 PRIMARY OSTEOARTHRITIS OF RIGHT KNEE: Primary | ICD-10-CM

## 2021-11-29 PROCEDURE — 1124F ACP DISCUSS-NO DSCNMKR DOCD: CPT | Performed by: ANESTHESIOLOGY

## 2021-11-29 PROCEDURE — 99214 OFFICE O/P EST MOD 30 MIN: CPT | Performed by: ANESTHESIOLOGY

## 2021-11-29 RX ORDER — LIDOCAINE HYDROCHLORIDE 10 MG/ML
10 INJECTION, SOLUTION EPIDURAL; INFILTRATION; INTRACAUDAL; PERINEURAL ONCE
Status: CANCELLED | OUTPATIENT
Start: 2021-11-29 | End: 2021-11-29

## 2021-11-29 RX ORDER — BUPIVACAINE HCL/PF 2.5 MG/ML
10 VIAL (ML) INJECTION ONCE
Status: CANCELLED | OUTPATIENT
Start: 2021-11-29 | End: 2021-11-29

## 2021-11-29 RX ORDER — METHYLPREDNISOLONE ACETATE 80 MG/ML
80 INJECTION, SUSPENSION INTRA-ARTICULAR; INTRALESIONAL; INTRAMUSCULAR; PARENTERAL; SOFT TISSUE ONCE
Status: CANCELLED | OUTPATIENT
Start: 2021-11-29 | End: 2021-11-29

## 2021-12-16 ENCOUNTER — HOSPITAL ENCOUNTER (OUTPATIENT)
Facility: HOSPITAL | Age: 76
Setting detail: OUTPATIENT SURGERY
Discharge: HOME | End: 2021-12-16
Attending: ANESTHESIOLOGY | Admitting: ANESTHESIOLOGY
Payer: MEDICARE

## 2021-12-16 ENCOUNTER — APPOINTMENT (OUTPATIENT)
Dept: RADIOLOGY | Facility: HOSPITAL | Age: 76
End: 2021-12-16
Payer: MEDICARE

## 2021-12-16 VITALS
TEMPERATURE: 97.8 F | DIASTOLIC BLOOD PRESSURE: 64 MMHG | OXYGEN SATURATION: 98 % | WEIGHT: 200 LBS | RESPIRATION RATE: 18 BRPM | BODY MASS INDEX: 28 KG/M2 | SYSTOLIC BLOOD PRESSURE: 122 MMHG | HEIGHT: 71 IN | HEART RATE: 78 BPM

## 2021-12-16 PROCEDURE — 76000 FLUOROSCOPY <1 HR PHYS/QHP: CPT

## 2021-12-16 PROCEDURE — 64454 NJX AA&/STRD GNCLR NRV BRNCH: CPT | Performed by: ANESTHESIOLOGY

## 2021-12-16 RX ORDER — BUPIVACAINE HCL/PF 2.5 MG/ML
10 VIAL (ML) INJECTION ONCE
Status: COMPLETED | OUTPATIENT
Start: 2021-12-16 | End: 2021-12-16

## 2021-12-16 RX ORDER — METHYLPREDNISOLONE ACETATE 80 MG/ML
80 INJECTION, SUSPENSION INTRA-ARTICULAR; INTRALESIONAL; INTRAMUSCULAR; PARENTERAL; SOFT TISSUE ONCE
Status: DISCONTINUED | OUTPATIENT
Start: 2021-12-16 | End: 2021-12-16 | Stop reason: HOSPADM

## 2021-12-16 RX ORDER — PAPAVERINE HCL 150 MG
10 CAPSULE, EXTENDED RELEASE ORAL ONCE
Status: COMPLETED | OUTPATIENT
Start: 2021-12-16 | End: 2021-12-16

## 2021-12-16 RX ORDER — DEXAMETHASONE SODIUM PHOSPHATE 10 MG/ML
10 INJECTION, SOLUTION INTRAMUSCULAR; INTRAVENOUS ONCE
Status: DISCONTINUED | OUTPATIENT
Start: 2021-12-16 | End: 2021-12-16

## 2021-12-16 RX ORDER — LIDOCAINE HYDROCHLORIDE 10 MG/ML
10 INJECTION, SOLUTION EPIDURAL; INFILTRATION; INTRACAUDAL; PERINEURAL ONCE
Status: COMPLETED | OUTPATIENT
Start: 2021-12-16 | End: 2021-12-16

## 2021-12-29 ENCOUNTER — OFFICE VISIT (OUTPATIENT)
Dept: PAIN MEDICINE | Facility: CLINIC | Age: 76
End: 2021-12-29
Payer: MEDICARE

## 2021-12-29 VITALS
RESPIRATION RATE: 20 BRPM | TEMPERATURE: 97.5 F | SYSTOLIC BLOOD PRESSURE: 150 MMHG | HEART RATE: 59 BPM | WEIGHT: 202 LBS | DIASTOLIC BLOOD PRESSURE: 64 MMHG | HEIGHT: 71 IN | BODY MASS INDEX: 28.28 KG/M2

## 2021-12-29 DIAGNOSIS — M17.11 PRIMARY OSTEOARTHRITIS OF RIGHT KNEE: Primary | ICD-10-CM

## 2021-12-29 PROCEDURE — 99214 OFFICE O/P EST MOD 30 MIN: CPT | Performed by: ANESTHESIOLOGY

## 2021-12-29 RX ORDER — METHYLPREDNISOLONE ACETATE 80 MG/ML
80 INJECTION, SUSPENSION INTRA-ARTICULAR; INTRALESIONAL; INTRAMUSCULAR; PARENTERAL; SOFT TISSUE ONCE
Status: CANCELLED | OUTPATIENT
Start: 2021-12-29 | End: 2021-12-29

## 2021-12-29 RX ORDER — BUPIVACAINE HCL/PF 2.5 MG/ML
5 VIAL (ML) INJECTION ONCE
Status: CANCELLED | OUTPATIENT
Start: 2021-12-29 | End: 2021-12-29

## 2021-12-29 RX ORDER — LIDOCAINE HYDROCHLORIDE 10 MG/ML
10 INJECTION, SOLUTION EPIDURAL; INFILTRATION; INTRACAUDAL; PERINEURAL ONCE
Status: CANCELLED | OUTPATIENT
Start: 2021-12-29 | End: 2021-12-29

## 2021-12-29 NOTE — H&P (VIEW-ONLY)
Assessment  1  Primary osteoarthritis of right knee - Right  -     Case request operating room: right genicular nerve radiofrequency ablation (3); Standing  -     Case request operating room: right genicular nerve radiofrequency ablation (3)    Greater than 90% relief of pain with improved ability to participate with IADLs after right genicular nerve blocks (3) for approximately 2 days; previously described more achy, nagging, indolent, crampy throbbing stabbing pain with weight bearing in right knee where xray reveals moderate degenerative changes in the medial and anterior portions of right knee  At PT joint was overworked and patient reports swelling  Risks, benefits and alternatives to genicular nerve RF lesioning thoroughly discussed with handouts provided; questions answered  Greater than 90% relief of pain with improved ability to participate with IADLs after ILESI at L5-S1 for 2 weeks; similar result with caudal carlitos resulting in 2 weeks of relief; pain has returned and patient has appt with neurosurgery to discuss decompressive surgical treatment options  Chronic and progressive lumbar radicular pain in L5 and S1 dermatomal distributions accompanied by weakness numbness and paresthesias  Multilevel lumbar degenerative disc disease most pronounced at L4-5 where there is a central and right paramedian disc herniation/extrusion resulting in moderate canal stenosis  Symptoms consistent with lumbar spinal stenosis with neurogenic claudication  Facet joint injections did not provide significant relief in the past  Pain relieved by leaning forward, pausing for a few moments before resuming ambulation  +SLR bilaterally, otherwise 5/5 strength in all extremities with AROM  Will obtain imaging to guide interventional therapy  Risks, benefits and alternatives to medications and CARLITSO discussed with patient      Plan  -Right genicular nerve radiofrequency ablation (3)  -f/u with NSGY consult 12/30/21  -gabapentin 300 mg t i d  Ordered for patient; counseled regarding sedative effects of taking this medication and provided up titration calendar  Counseled not to take medication while driving or operating heavy machinery/using stairs  -meloxicam 7 5 mg b i d  tapered and replaced with voltaren gel to be applied to right knee  Patient educated regarding bleeding risk of taking this medication not taking any other nonsteroidal anti-inflammatory medications while taking this medication; counseled thoroughly regarding potential risk of Cardiovascular injury, Kidney injury, Gastrointestinal ulceration/bleeding  Patient voiced understanding  -physical therapy for lumbar radiculopathy, right knee pain; Core and knee exercises additionally provided for physician directed home PT that the patient plans to participate with for 1 hour, twice a week for the next 6 weeks  There are risks associated with opioid medications, including dependence, addiction and tolerance  The patient understands and agrees to use these medications only as prescribed  Potential side effects of the medications include, but are not limited to, constipation, drowsiness, addiction, impaired judgment and risk of fatal overdose if not taken as prescribed  The patient was warned against driving while taking sedation medications  Sharing medications is a felony  At this point in time, the patient is showing no signs of addiction, abuse, diversion or suicidal ideation  [place] Prescription Drug Monitoring Program report was reviewed and was appropriate     Complete risks and benefits including bleeding, infection, tissue reaction, nerve injury and allergic reaction were discussed  The approach was demonstrated using models and literature was provided  Verbal and written consent was obtained  My impressions and treatment recommendations were discussed in detail with the patient who verbalized understanding and had no further questions    Discharge instructions were provided  I personally saw and examined the patient and I agree with the above discussed plan of care  No orders of the defined types were placed in this encounter  History of Present Illness    Greater than 90% relief of pain with improved ability to participate with IADLs after right genicular nerve blocks (3) for approximately 2 days; previously described more achy, nagging, indolent, crampy throbbing stabbing pain with weight bearing in right knee where xray reveals moderate degenerative changes in the medial and anterior portions of right knee  At PT joint was overworked and patient reports swelling  Risks, benefits and alternatives to genicular nerve RF lesioning thoroughly discussed with handouts provided; questions answered  Greater than 90% relief of pain with improved ability to participate with IADLs after ILESI at L5-S1 for 2 weeks; similar result with caudal carlitos resulting in 2 weeks of relief; pain has returned and patient has appt with neurosurgery to discuss decompressive surgical treatment options  Vidya Baez is a 64 y o  male  With pmhx of xol, htn, dm-2, smoking presenting with a long history of chronic lumbar radicular pain in an L5 and S1 dermatomal distributions  Debilitating weakness numbness and paresthesias accompany the pain  The patient rates the pain at a 8/10 accompanied by electric shock-like shooting features and crampy burning pain in the aforementioned dermatomal distributions  The pain is worse in the mornings as well as the end of the day; exertion such as walking for long periods of time seems to exacerbate the pain  The patient can hardly walk more than a few blocks without having debilitating pain  He tries to maintain an active lifestyle and finds that the current degree of pain seems to compromises his efforts  The pain significantly impacts independent activities of daily living and contributes to significant disability    He has attempted physical therapy without significant benefit in the past   He has taken celebrex with limited relief of the pain as well  He has never tried epidural steroid injections in the past but has previously trialed facet joint injections without significant benefit  He denies any bowel or bladder dysfunction/incontinence  I have personally reviewed and/or updated the patient's past medical history, past surgical history, family history, social history, current medications, allergies, and vital signs today  Review of Systems   Constitutional: Positive for activity change  HENT: Negative  Eyes: Negative  Respiratory: Negative  Cardiovascular: Negative  Gastrointestinal: Negative  Endocrine: Negative  Genitourinary: Negative  Musculoskeletal: Positive for arthralgias, back pain, gait problem and myalgias  Skin: Negative  Allergic/Immunologic: Negative  Neurological: Positive for weakness and numbness  Hematological: Negative  Psychiatric/Behavioral: Negative  All other systems reviewed and are negative        Patient Active Problem List   Diagnosis    Lumbar radiculopathy - Bilateral    Spinal stenosis of lumbar region with neurogenic claudication    Primary osteoarthritis of right knee - Right       Past Medical History:   Diagnosis Date    AV block, 1st degree     BPH (benign prostatic hyperplasia)     [facility]     Diabetes mellitus ([facility] )     takes metformin    Hyperlipidemia     Hypertension        Past Surgical History:   Procedure Laterality Date    COLONOSCOPY      EPIDURAL BLOCK INJECTION N/A 10/19/2021    Procedure: L5 S1 INTERLAMINAR EPIDURAL STEROID INJECTION;  Surgeon: Robles Meyers MD;  Location: [facility];  Service: Pain Management     EYE SURGERY      PROSTATE SURGERY      TONSILLECTOMY AND ADENOIDECTOMY      TRANSURETHRAL RESECTION OF PROSTATE         Family History   Problem Relation Age of Onset    Diabetes Mother     No Known Problems Father        Social History     Occupational History    Not on file   Tobacco Use    Smoking status: Current Every Day Smoker     Packs/day: 0 50     Years: 15 00     Pack years: 7 50    Smokeless tobacco: Never Used   Vaping Use    Vaping Use: Never used   Substance and Sexual Activity    Alcohol use: Never    Drug use: Never    Sexual activity: Not on file       Current Outpatient Medications on File Prior to Visit   Medication Sig    aspirin (ECOTRIN LOW STRENGTH) 81 mg EC tablet     Diclofenac Sodium (VOLTAREN) 1 % Apply 2 g topically 4 (four) times a day    gabapentin (NEURONTIN) 300 mg capsule Take 1 capsule (300 mg total) by mouth 3 (three) times a day    hydrochlorothiazide (HYDRODIURIL) 25 mg tablet Take by mouth    lisinopril (ZESTRIL) 10 mg tablet Take by mouth      tadalafil (CIALIS) 20 MG tablet 20 mg    Empagliflozin 10 MG TABS 10 mg    lisinopril (ZESTRIL) 40 mg tablet 40 mg    metFORMIN (GLUCOPHAGE) 1000 MG tablet 1,000 mg     No current facility-administered medications on file prior to visit  Allergies   Allergen Reactions    Simvastatin Other (See Comments)         Physical Exam    /64   Pulse 59   Temp 97 5 °F (36 4 °C)   Resp 20   Ht 5' 11" (1 803 m)   Wt 91 6 kg (202 lb)   BMI 28 17 kg/m²     Constitutional: normal, well developed, well nourished, alert, in no distress and non-toxic and no overt pain behavior  and overweight  Eyes: anicteric  HEENT: grossly intact  Neck: supple, symmetric, trachea midline and no masses   Pulmonary:even and unlabored  Cardiovascular:No edema or pitting edema present  Skin:Normal without rashes or lesions and well hydrated  Psychiatric:Mood and affect appropriate  Neurologic:Cranial Nerves II-XII grossly intact Sensation grossly intact; no clonus negative hunt's  Reflexes 2+ and brisk  SLR positive bilaterally  Musculoskeletal:normal gait   5/5 strength in all extremities with AROM bilaterally  Normal heel toe and tip toe walking  pain with lumbar facet loading bilaterally and with lateral spine rotation  ttp over lumbar paraspinal muscles  Negative gerry's test, negative gaenslen's negative SIJ loading bilaterally  Imaging    Outside xrays reviewed of knee and pelvis showing moderate degenerative changes  MRI LUMBAR SPINE WITHOUT CONTRAST     INDICATION: [id number]: Radiculopathy, lumbar region      COMPARISON:  None      TECHNIQUE:  Sagittal T1, sagittal T2, sagittal inversion recovery, axial T1 and axial T2, coronal T2     IMAGE QUALITY:  Diagnostic     FINDINGS:     VERTEBRAL BODIES:  There are 5 lumbar type vertebral bodies  Normal alignment of the lumbar spine  No spondylolysis or spondylolisthesis  No scoliosis  No compression fracture  Multiple hemangiomas are seen within the lower thoracic and lumbar   vertebral bodies      SACRUM:  Normal signal within the sacrum  No evidence of insufficiency or stress fracture      DISTAL CORD AND CONUS:  Normal size and signal within the distal cord and conus      PARASPINAL SOFT TISSUES:  There is an exophytic cyst arising from the lower pole the right kidney incompletely imaged on this examination  This measures at least 8 5 cm in craniocaudad dimension      LOWER THORACIC DISC SPACES:  Normal disc height and signal   No disc herniation, canal stenosis or foraminal narrowing      LUMBAR DISC SPACES:     L1-L2:  Mild annular bulging with anterior osteophyte formation  No disc herniation  No canal stenosis or foraminal nerve impingement      L2-L3:  Mild annular bulging  Mild endplate degenerative change  Trace facet effusions  Mild canal stenosis and bilateral foraminal narrowing      L3-L4:  Mild diffuse annular bulging  Mild canal stenosis and bilateral foraminal narrowing      L4-L5:  There is loss of disc height with annular bulging and a small central disc protrusion    Slight superior and inferior extrusion to the right of midline  Mild right greater than left facet degenerative change  Moderate central canal stenosis with   distortion of the thecal sac  Mild bilateral foraminal narrowing      L5-S1:  Minor annular bulging and facet degenerative change  No disc herniation, canal stenosis or foraminal nerve impingement      IMPRESSION:     Multilevel lumbar degenerative disc disease most pronounced at L4-5 where there is a central and right paramedian disc herniation/extrusion resulting in moderate canal stenosis

## 2021-12-30 ENCOUNTER — CONSULT (OUTPATIENT)
Dept: NEUROSURGERY | Facility: CLINIC | Age: 76
End: 2021-12-30
Payer: MEDICARE

## 2021-12-30 VITALS
SYSTOLIC BLOOD PRESSURE: 156 MMHG | BODY MASS INDEX: 28.69 KG/M2 | RESPIRATION RATE: 16 BRPM | WEIGHT: 204.9 LBS | TEMPERATURE: 97.7 F | HEART RATE: 69 BPM | DIASTOLIC BLOOD PRESSURE: 71 MMHG | HEIGHT: 71 IN

## 2021-12-30 DIAGNOSIS — M48.062 SPINAL STENOSIS OF LUMBAR REGION WITH NEUROGENIC CLAUDICATION: ICD-10-CM

## 2021-12-30 DIAGNOSIS — M54.16 LUMBAR RADICULOPATHY: Primary | ICD-10-CM

## 2021-12-30 PROCEDURE — 1124F ACP DISCUSS-NO DSCNMKR DOCD: CPT | Performed by: NEUROLOGICAL SURGERY

## 2021-12-30 PROCEDURE — 99203 OFFICE O/P NEW LOW 30 MIN: CPT | Performed by: NEUROLOGICAL SURGERY

## 2021-12-30 RX ORDER — VITAMIN B COMPLEX
1 CAPSULE ORAL DAILY
COMMUNITY

## 2021-12-30 RX ORDER — CEFAZOLIN SODIUM 2 G/50ML
2000 SOLUTION INTRAVENOUS ONCE
Status: CANCELLED | OUTPATIENT
Start: 2021-12-30 | End: 2021-12-30

## 2021-12-30 RX ORDER — CHLORHEXIDINE GLUCONATE 0.12 MG/ML
15 RINSE ORAL ONCE
Status: CANCELLED | OUTPATIENT
Start: 2021-12-30 | End: 2021-12-30

## 2022-01-04 ENCOUNTER — APPOINTMENT (OUTPATIENT)
Dept: RADIOLOGY | Facility: HOSPITAL | Age: 77
End: 2022-01-04
Payer: MEDICARE

## 2022-01-04 ENCOUNTER — HOSPITAL ENCOUNTER (OUTPATIENT)
Facility: HOSPITAL | Age: 77
Setting detail: OUTPATIENT SURGERY
Discharge: HOME | End: 2022-01-04
Attending: ANESTHESIOLOGY | Admitting: ANESTHESIOLOGY
Payer: MEDICARE

## 2022-01-04 ENCOUNTER — TELEPHONE (OUTPATIENT)
Dept: RADIOLOGY | Facility: CLINIC | Age: 77
End: 2022-01-04

## 2022-01-04 VITALS
HEIGHT: 71 IN | DIASTOLIC BLOOD PRESSURE: 74 MMHG | WEIGHT: 202 LBS | TEMPERATURE: 97.8 F | SYSTOLIC BLOOD PRESSURE: 122 MMHG | RESPIRATION RATE: 18 BRPM | HEART RATE: 63 BPM | OXYGEN SATURATION: 98 % | BODY MASS INDEX: 28.28 KG/M2

## 2022-01-04 LAB — GLUCOSE SERPL-MCNC: 123 MG/DL (ref 65–140)

## 2022-01-04 PROCEDURE — 76000 FLUOROSCOPY <1 HR PHYS/QHP: CPT

## 2022-01-04 PROCEDURE — 82948 REAGENT STRIP/BLOOD GLUCOSE: CPT

## 2022-01-04 PROCEDURE — 64624 DSTRJ NULYT AGT GNCLR NRV: CPT | Performed by: ANESTHESIOLOGY

## 2022-01-04 RX ORDER — DEXAMETHASONE SODIUM PHOSPHATE 10 MG/ML
INJECTION, SOLUTION INTRAMUSCULAR; INTRAVENOUS AS NEEDED
Status: DISCONTINUED | OUTPATIENT
Start: 2022-01-04 | End: 2022-01-04 | Stop reason: HOSPADM

## 2022-01-04 RX ORDER — BUPIVACAINE HYDROCHLORIDE 2.5 MG/ML
INJECTION, SOLUTION EPIDURAL; INFILTRATION; INTRACAUDAL AS NEEDED
Status: DISCONTINUED | OUTPATIENT
Start: 2022-01-04 | End: 2022-01-04 | Stop reason: HOSPADM

## 2022-01-04 RX ORDER — LIDOCAINE HYDROCHLORIDE 10 MG/ML
INJECTION, SOLUTION EPIDURAL; INFILTRATION; INTRACAUDAL; PERINEURAL AS NEEDED
Status: DISCONTINUED | OUTPATIENT
Start: 2022-01-04 | End: 2022-01-04 | Stop reason: HOSPADM

## 2022-01-04 RX ORDER — LIDOCAINE HYDROCHLORIDE 20 MG/ML
INJECTION, SOLUTION EPIDURAL; INFILTRATION; INTRACAUDAL; PERINEURAL AS NEEDED
Status: DISCONTINUED | OUTPATIENT
Start: 2022-01-04 | End: 2022-01-04 | Stop reason: HOSPADM

## 2022-01-04 RX ORDER — OXYCODONE HYDROCHLORIDE AND ACETAMINOPHEN 5; 325 MG/1; MG/1
1 TABLET ORAL ONCE
Status: COMPLETED | OUTPATIENT
Start: 2022-01-04 | End: 2022-01-04

## 2022-01-04 RX ORDER — ALPRAZOLAM 0.5 MG/1
0.5 TABLET ORAL ONCE
Status: COMPLETED | OUTPATIENT
Start: 2022-01-04 | End: 2022-01-04

## 2022-01-04 RX ADMIN — ALPRAZOLAM 0.5 MG: 0.5 TABLET ORAL at 08:23

## 2022-01-04 RX ADMIN — OXYCODONE HYDROCHLORIDE AND ACETAMINOPHEN 1 TABLET: 5; 325 TABLET ORAL at 08:23

## 2022-01-04 NOTE — PROCEDURES
Right Genicular Nerve Block Radiofrequency ablation(3)     PROCEDURE: Genicular Nerve Block Radiofrequency ablation at Three (3) Locations with Fluoroscopic Guidance     1) Right superior medial genicular nerve  2) Right inferior medial genicular nerve  3) Right superior lateral genicular nerve  4) Fluoroscopic needle guidance for the above     REASON FOR PROCEDURE:   1) Knee pain RIGHT  2) Genicular neuralgia, neuritis, right knee OA, pain    PHYSICIAN: Walden Buerger MD     MEDICATIONS INJECTED:      1) 2 mL of a 6mL mixture of (10mg/ml dexamethasone (1ml) and 5ml 0 25% bupivicaine) at each of the three sites  2) 1mL of 2% lidocaine injected at each of the three sites  3)  5ml of 1% lidocaine        LOCAL ANESTHETIC INJECTED: 5mL of 1% lidocaine; 5ml of 0 25% bupivicaine; 3mL 2% lidocaine  SEDATION MEDICATIONS: None  ESTIMATED BLOOD LOSS: None   COMPLICATIONS: None     TECHNIQUE: Time-out was taken to identify the correct patient, procedure and side prior to starting the procedure  With the patient lying in the supine position, the patient was prepped and draped in the usual sterile fashion using Chloraprep and drapes  Local anesthetic was administered by raising a skin wheal and going down to the hub of a 27 gauge 1 5 inch needle  In an A-P fluoroscopic view, 20 gauge 100mm curved active tip needles were introduced through the anesthetized skin and down to the junction of the femoral diaphysis and the medial femoral epicondyle, then another needle to the femoral diaphysis and the lateral femoral epicondyle, and the 3rd needle to the tibial diaphysis and medial tibial condyle  Placement of all three needles was confirmed with a lateral fluoroscopic view  After a negative aspirate to make sure that there was no intravascular placement, motor stimulation was performed at 2 Hz and 1 2 volts generating a twitch in the superficial musculature with no motor activity in the right lower extremity   1 ml of 2% lidocaine was injected through each needle  A 100mm length, 10mm curved active tip radiofrequency probe was advanced through the needles  Lesioning was performed for 90 seconds at 80 degrees centigrade  The lesion was repeated once more after slight repositioning of the needles on an oblique view  Once the lesions were complete, 2ml of a solution consisting of 5mL 0 25% bupivacaine and 1 mL dexamethasone (10mg/mL) was injected through each needle and then removed with a 1% lidocaine flush  The patient's knee was cleaned, and bandages were placed at the needle insertion site  The needles were all removed intact  A sterile dressing was applied  The procedure was completed without complications and was tolerated well  The patient was monitored after the procedure  The patient (or responsible party) was given post-procedure and discharge instructions to follow at home  The patient was discharged in stable condition  A follow-up appointment was made

## 2022-01-04 NOTE — OP NOTE
PERATIVE REPORT  PATIENT NAME: Ulisses Richard    :  1945  MRN: [id number]  Pt Location:  GI ROOM 01    SURGERY DATE: 2022    Surgeon(s) and Role: Shane Norton MD - Primary    Preop Diagnosis:  Primary osteoarthritis of right knee [M17 11]  Genicular Neuralgia    Post-Op Diagnosis Codes:     * Primary osteoarthritis of right knee [M17 11]  Genicular Neuralgia    Procedure(s) (LRB):  right genicular nerve radiofrequency ablation (3) (Right)    Specimen(s):  * No specimens in log *    Estimated Blood Loss:   Minimal    Drains:  * No LDAs found *    Anesthesia Type:   Local    Operative Indications:  Primary osteoarthritis of right knee [M17 11]  Genicular Neuralgia    Operative Findings:  Right genicular nerve regions identified under fluoroscopic guidance  Appropriate motor testing performed prior to radiofrequency lesioning      Complications:   None    Procedure and Technique:  Please see detailed procedure note     I was present for the entire procedure    Patient Disposition:  PACU     SIGNATURE: Ilda Huerta MD  DATE: 2022  TIME: 9:29 AM

## 2022-01-04 NOTE — INTERVAL H&P NOTE
H&P reviewed  After examining the patient I find no changes in the patients condition since the H&P had been written      Vitals:    01/04/22 0816   BP: 138/64   Pulse: 61   Resp: 20   Temp: 97 9 °F (36 6 °C)   SpO2: 97%

## 2022-01-12 ENCOUNTER — APPOINTMENT (OUTPATIENT)
Dept: LAB | Facility: HOSPITAL | Age: 77
End: 2022-01-12
Payer: MEDICARE

## 2022-01-12 ENCOUNTER — OFFICE VISIT (OUTPATIENT)
Dept: LAB | Facility: HOSPITAL | Age: 77
End: 2022-01-12
Payer: MEDICARE

## 2022-01-12 DIAGNOSIS — I10 ESSENTIAL (PRIMARY) HYPERTENSION: ICD-10-CM

## 2022-01-12 DIAGNOSIS — M54.16 LUMBAR RADICULOPATHY: ICD-10-CM

## 2022-01-12 DIAGNOSIS — J44.9 CHRONIC OBSTRUCTIVE PULMONARY DISEASE, UNSPECIFIED COPD TYPE (HCC): ICD-10-CM

## 2022-01-12 DIAGNOSIS — M48.062 SPINAL STENOSIS OF LUMBAR REGION WITH NEUROGENIC CLAUDICATION: ICD-10-CM

## 2022-01-12 DIAGNOSIS — M17.10 UNILATERAL PRIMARY OSTEOARTHRITIS, UNSPECIFIED KNEE: ICD-10-CM

## 2022-01-12 DIAGNOSIS — E11.8 TYPE 2 DIABETES MELLITUS WITH UNSPECIFIED COMPLICATIONS (HCC): ICD-10-CM

## 2022-01-12 DIAGNOSIS — E78.5 HYPERLIPIDEMIA, UNSPECIFIED HYPERLIPIDEMIA TYPE: ICD-10-CM

## 2022-01-12 DIAGNOSIS — G72.0 DRUG-INDUCED MYOPATHY: ICD-10-CM

## 2022-01-12 LAB
ALBUMIN SERPL BCP-MCNC: 4 G/DL (ref 3.5–5)
ALP SERPL-CCNC: 76 U/L (ref 46–116)
ALT SERPL W P-5'-P-CCNC: 29 U/L (ref 12–78)
ANION GAP SERPL CALCULATED.3IONS-SCNC: 9 MMOL/L (ref 4–13)
APTT PPP: 26 SECONDS (ref 23–37)
AST SERPL W P-5'-P-CCNC: 12 U/L (ref 5–45)
BACTERIA UR QL AUTO: NORMAL /HPF
BASOPHILS # BLD AUTO: 0.1 THOUSANDS/ΜL (ref 0–0.1)
BASOPHILS NFR BLD AUTO: 1 % (ref 0–1)
BILIRUB SERPL-MCNC: 0.94 MG/DL (ref 0.2–1)
BILIRUB UR QL STRIP: NEGATIVE
BUN SERPL-MCNC: 26 MG/DL (ref 5–25)
CALCIUM SERPL-MCNC: 9.7 MG/DL (ref 8.3–10.1)
CHLORIDE SERPL-SCNC: 97 MMOL/L (ref 100–108)
CHOLEST SERPL-MCNC: 197 MG/DL
CLARITY UR: CLEAR
CO2 SERPL-SCNC: 26 MMOL/L (ref 21–32)
COLOR UR: YELLOW
CREAT SERPL-MCNC: 1.18 MG/DL (ref 0.6–1.3)
EOSINOPHIL # BLD AUTO: 0.38 THOUSAND/ΜL (ref 0–0.61)
EOSINOPHIL NFR BLD AUTO: 4 % (ref 0–6)
ERYTHROCYTE [DISTWIDTH] IN BLOOD BY AUTOMATED COUNT: 13.2 % (ref 11.6–15.1)
EST. AVERAGE GLUCOSE BLD GHB EST-MCNC: 189 MG/DL
GFR SERPL CREATININE-BSD FRML MDRD: 58 ML/MIN/1.73SQ M
GLUCOSE P FAST SERPL-MCNC: 153 MG/DL (ref 65–99)
GLUCOSE UR STRIP-MCNC: ABNORMAL MG/DL
HBA1C MFR BLD: 8.2 %
HCT VFR BLD AUTO: 48.9 % (ref 36.5–49.3)
HDLC SERPL-MCNC: 46 MG/DL
HGB BLD-MCNC: 16.5 G/DL (ref 12–17)
HGB UR QL STRIP.AUTO: ABNORMAL
IMM GRANULOCYTES # BLD AUTO: 0.08 THOUSAND/UL (ref 0–0.2)
IMM GRANULOCYTES NFR BLD AUTO: 1 % (ref 0–2)
INR PPP: 0.97 (ref 0.84–1.19)
KETONES UR STRIP-MCNC: NEGATIVE MG/DL
LDLC SERPL CALC-MCNC: 104 MG/DL (ref 0–100)
LEUKOCYTE ESTERASE UR QL STRIP: ABNORMAL
LYMPHOCYTES # BLD AUTO: 2.6 THOUSANDS/ΜL (ref 0.6–4.47)
LYMPHOCYTES NFR BLD AUTO: 25 % (ref 14–44)
MCH RBC QN AUTO: 30.7 PG (ref 26.8–34.3)
MCHC RBC AUTO-ENTMCNC: 33.7 G/DL (ref 31.4–37.4)
MCV RBC AUTO: 91 FL (ref 82–98)
MONOCYTES # BLD AUTO: 0.82 THOUSAND/ΜL (ref 0.17–1.22)
MONOCYTES NFR BLD AUTO: 8 % (ref 4–12)
NEUTROPHILS # BLD AUTO: 6.64 THOUSANDS/ΜL (ref 1.85–7.62)
NEUTS SEG NFR BLD AUTO: 61 % (ref 43–75)
NITRITE UR QL STRIP: NEGATIVE
NON-SQ EPI CELLS URNS QL MICRO: NORMAL /HPF
NONHDLC SERPL-MCNC: 151 MG/DL
NRBC BLD AUTO-RTO: 0 /100 WBCS
PH UR STRIP.AUTO: 5.5 [PH]
PLATELET # BLD AUTO: 306 THOUSANDS/UL (ref 149–390)
PMV BLD AUTO: 10.6 FL (ref 8.9–12.7)
POTASSIUM SERPL-SCNC: 4 MMOL/L (ref 3.5–5.3)
PROT SERPL-MCNC: 7.3 G/DL (ref 6.4–8.2)
PROT UR STRIP-MCNC: NEGATIVE MG/DL
PROTHROMBIN TIME: 12.8 SECONDS (ref 11.6–14.5)
RBC # BLD AUTO: 5.37 MILLION/UL (ref 3.88–5.62)
RBC #/AREA URNS AUTO: NORMAL /HPF
SODIUM SERPL-SCNC: 132 MMOL/L (ref 136–145)
SP GR UR STRIP.AUTO: 1.02 (ref 1–1.03)
TRIGL SERPL-MCNC: 237 MG/DL
UROBILINOGEN UR QL STRIP.AUTO: 0.2 E.U./DL
WBC # BLD AUTO: 10.62 THOUSAND/UL (ref 4.31–10.16)
WBC #/AREA URNS AUTO: NORMAL /HPF

## 2022-01-12 PROCEDURE — 80061 LIPID PANEL: CPT

## 2022-01-12 PROCEDURE — 36415 COLL VENOUS BLD VENIPUNCTURE: CPT

## 2022-01-12 PROCEDURE — 85025 COMPLETE CBC W/AUTO DIFF WBC: CPT

## 2022-01-12 PROCEDURE — 93005 ELECTROCARDIOGRAM TRACING: CPT

## 2022-01-12 PROCEDURE — 80053 COMPREHEN METABOLIC PANEL: CPT

## 2022-01-12 PROCEDURE — 81001 URINALYSIS AUTO W/SCOPE: CPT | Performed by: NEUROLOGICAL SURGERY

## 2022-01-12 PROCEDURE — 83036 HEMOGLOBIN GLYCOSYLATED A1C: CPT

## 2022-01-12 PROCEDURE — 85730 THROMBOPLASTIN TIME PARTIAL: CPT

## 2022-01-12 PROCEDURE — 85610 PROTHROMBIN TIME: CPT

## 2022-01-13 LAB
ATRIAL RATE: 65 BPM
P AXIS: 69 DEGREES
PR INTERVAL: 224 MS
QRS AXIS: -31 DEGREES
QRSD INTERVAL: 88 MS
QT INTERVAL: 376 MS
QTC INTERVAL: 391 MS
T WAVE AXIS: -7 DEGREES
VENTRICULAR RATE: 65 BPM

## 2022-01-19 ENCOUNTER — OFFICE VISIT (OUTPATIENT)
Dept: PAIN MEDICINE | Facility: CLINIC | Age: 77
End: 2022-01-19
Payer: MEDICARE

## 2022-01-19 VITALS
WEIGHT: 202.8 LBS | DIASTOLIC BLOOD PRESSURE: 62 MMHG | HEIGHT: 71 IN | RESPIRATION RATE: 20 BRPM | TEMPERATURE: 97 F | BODY MASS INDEX: 28.39 KG/M2 | HEART RATE: 59 BPM | SYSTOLIC BLOOD PRESSURE: 132 MMHG

## 2022-01-19 DIAGNOSIS — N18.30 STAGE 3 CHRONIC KIDNEY DISEASE, UNSPECIFIED WHETHER STAGE 3A OR 3B CKD (HCC): Primary | ICD-10-CM

## 2022-01-19 DIAGNOSIS — M54.16 LUMBAR RADICULOPATHY: ICD-10-CM

## 2022-01-19 DIAGNOSIS — M17.11 PRIMARY OSTEOARTHRITIS OF RIGHT KNEE: ICD-10-CM

## 2022-01-19 PROCEDURE — 99214 OFFICE O/P EST MOD 30 MIN: CPT | Performed by: ANESTHESIOLOGY

## 2022-01-19 RX ORDER — GABAPENTIN 300 MG/1
300 CAPSULE ORAL 3 TIMES DAILY
Qty: 90 CAPSULE | Refills: 0 | Status: SHIPPED | OUTPATIENT
Start: 2022-01-19 | End: 2022-02-28

## 2022-01-19 NOTE — PATIENT INSTRUCTIONS
Core Strengthening Exercises   WHAT YOU NEED TO KNOW:   Your core includes the muscles of your lower back, hip, pelvis, and abdomen  Core strengthening exercises help heal and strengthen these muscles  This helps prevent another injury, and keeps your pelvis, spine, and hips in the correct position  DISCHARGE INSTRUCTIONS:   Contact your healthcare provider if:   · You have sharp or worsening pain during exercise or at rest     · You have questions or concerns about your shoulder exercises  Safety tips:  Talk to your healthcare provider before you start an exercise program  A physical therapist can teach you how to do core strengthening exercises safely  · Do the exercises on a mat or firm surface  A firm surface will support your spine and prevent low back pain  Do not do these exercises on a bed  · Move slowly and smoothly  Avoid fast or jerky motions  · Stop if you feel pain  Core exercises should not be painful  Stop if you feel pain  · Breathe normally during core exercises  Do not hold your breath  This may cause an increase in blood pressure and prevent muscle strengthening  Your healthcare provider will tell you when to inhale and exhale during the exercise  · Begin all of your exercises with abdominal bracing  Abdominal bracing helps warm up your core muscles  You can also practice abdominal bracing throughout the day  Lie on your back with your knees bent and feet flat on the floor  Place your arms in a relaxed position beside your body  Tighten your abdominal muscles  Pull your belly button in and up toward your spine  Hold for 5 seconds  Relax your muscles  Repeat 10 times  Core strengthening exercises: Your healthcare provider will tell you how often to do these exercises  The provider will also tell you how many repetitions of each exercise you should do  Hold each exercise for 5 seconds or as directed  As you get stronger, increase your hold to 10 to 15 seconds   You can do some of these exercises on a stability ball, or with a weight  Ask your healthcare provider how to use a stability ball or weight for these exercises:  · Bridging:  Lie on your back with your knees bent and feet flat on the floor  Rest your arms at your side  Tighten your buttocks, and then lift your hips 1 inch off the floor  Hold for 5 seconds  When you can do this exercise without pain for 10 seconds, increase the distance you lift your hips  A good goal is to be able to lift your hips so that your shoulders, hips, and knees are in a straight line  · Dead bug:  Lie on your back with your knees bent and feet flat on the floor  Place your arms in a relaxed position beside your body  Begin with abdominal bracing  Next, raise one leg, keeping your knee bent  Hold for 5 seconds  Repeat with the other leg  When you can do this exercise without pain for 10 to 15 seconds, you may raise one straight leg and hold  Repeat with the other leg  · Quadruped:  Place your hands and knees on the floor  Keep your wrists directly below your shoulders and your knees directly below your hips  Pull your belly button in toward your spine  Do not flatten or arch your back  Tighten your abdominal muscles below your belly button  Hold for 5 seconds  When you can do this exercise without pain for 10 to 15 seconds, you may extend one arm and hold  Repeat on the other side  · Side bridge exercises:      ? Standing side bridge:  Stand next to a wall and extend one arm toward the wall  Place your palm flat on the wall with your fingers pointing upward  Begin with abdominal bracing  Next, without moving your feet, slowly bend your arm to 90 degrees  Hold for 5 seconds  Repeat on the other side  When you can do this exercise without pain for 10 to 15 seconds, you may do the bent leg side bridge on the floor  ? Bent leg side bridge:  Lie on one side with your legs, hips, and shoulders in a straight line   Prop yourself up onto your forearm so your elbow is directly below your shoulder  Bend your knees back to 90 degrees  Begin with abdominal bracing  Next, lift your hips and balance yourself on your forearm and knees  Hold for 5 seconds  Repeat on the other side  When you can do this exercise without pain for 10 to 15 seconds, you may do the straight leg side bridge on the floor  ? Straight leg side bridge:  Lie on one side with your legs, hips, and shoulders in a straight line  Prop yourself up onto your forearm so your elbow is directly below your shoulder  Begin with abdominal bracing  Lift your hips off the floor and balance yourself on your forearm and the outside of your flexed foot  Do not let your ankle bend sideways  Hold for 5 seconds  Repeat on the other side  When you can do this exercise without pain for 10 to 15 seconds, ask your healthcare provider for more advanced exercises  · Superman:  Lie on your stomach  Extend your arms forward on the floor  Tighten your abdominal muscles and lift your right hand and left leg off the floor  Hold this position  Slowly return to the starting position  Tighten your abdominal muscles and lift your left hand and right leg off the floor  Hold this position  Slowly return to the starting position  · Clam:  Lie on your side with your knees bent  Put your bottom arm under your head to keep your neck in line  Put your top hand on your hip to keep your pelvis from moving  Put your heels together, and keep them together during this exercise  Slowly raise your top knee toward the ceiling  Then lower your leg so your knees are together  Repeat this exercise 10 times  Then switch sides and do the exercise 10 times with the other leg  · Curl up:  Lie on your back with your knees bent and feet flat on the floor  Place your hands, palms down, underneath your lower back   Next, with your elbows on the floor, lift your shoulders and chest 2 to 3 inches off the floor  Keep your head in line with your shoulders  Hold this position  Slowly return to the starting position  · Straight leg raises:  Lie on your back with one leg straight  Bend the other knee and place your foot flat on the floor  Tighten your abdominal muscles  Keep your leg straight and slowly lift it straight up 6 to 12 inches off the floor  Hold this position  Lower your leg slowly  Do as many repetitions as directed on this side  Repeat with the other leg  · Plank:  Lie on your stomach  Bend your elbows and place your forearms flat on the floor  Lift your chest, stomach, and knees off the floor  Make sure your elbows are below your shoulders  Your body should be in a straight line  Do not let your hips or lower back sink to the ground  Squeeze your abdominal muscles together and hold for 15 seconds  To make this exercise harder, hold for 30 seconds or lift 1 leg at a time  · Bicycles:  Lie on your back  Bend both knees and bring them toward your chest  Your calves should be parallel to the floor  Place the palms of your hands on the back of your head  Straighten your right leg and keep it lifted 2 inches off the floor  Raise your head and shoulders off the floor and twist towards your left  Keep your head and shoulders lifted  Bend your right knee while you straighten your left leg  Keep your left leg 2 inches off the floor  Twist your head and chest towards the left leg  Continue to straighten 1 leg at a time and twist        Follow up with your doctor as directed:  Write down your questions so you remember to ask them during your visits  © Copyright [employer] 2021 Information is for End User's use only and may not be sold, redistributed or otherwise used for commercial purposes  All illustrations and images included in CareNotes® are the copyrighted property of [employer]  or [facility] Geoff Honeycutt   The above information is an  only   It is not intended as medical advice for individual conditions or treatments  Talk to your doctor, nurse or pharmacist before following any medical regimen to see if it is safe and effective for you  Knee Exercises   AMBULATORY CARE:   What you need to know about knee exercises:  Knee exercises help strengthen the muscles around your knee  Strong muscles can help reduce pain and decrease your risk of future injury  Knee exercises also help you heal after an injury or surgery  · Start slow  These are beginning exercises  Ask your healthcare provider if you need to see a physical therapist for more advanced exercises  As you get stronger, you may be able to do more sets of each exercise or add weights  · Stop if you feel pain  It is normal to feel some discomfort at first  Regular exercise will help decrease your discomfort over time  · Do the exercises on both legs  Do this so both knees remain strong  · Warm up before you do knee exercises  Walk or ride a stationary bike for 5 or 10 minutes to warm your muscles  How to perform knee stretches safely:  Always stretch before you do strengthening exercises  Do these stretching exercises again after you do the strengthening exercises  Do these stretches 4 or 5 days a week, or as directed  · Standing calf stretch: Face a wall and place both palms flat on the wall, or hold the back of a chair for balance  Keep a slight bend in your knees  Take a big step backward with one leg  Keep your other leg directly under you  Keep both heels flat and press your hips forward  Hold the stretch for 30 seconds, and then relax for 30 seconds  Switch legs  Repeat 2 or 3 times on each leg  · Standing quadriceps stretch:  Stand and place one hand against a wall or hold the back of a chair for balance  With your weight on one leg, bend your other leg and grab your ankle  Bring your heel toward your buttocks  Hold the stretch for 30 to 60 seconds  Switch legs   Repeat 2 or 3 times on each leg  · Sitting hamstring stretch:  Sit with both legs straight in front of you  Do not point or flex your toes  Place your palms on the floor and slide your hands forward until you feel the stretch  Do not round your back  Hold the stretch for 30 seconds  Repeat 2 or 3 times  How to perform knee strengthening exercises safely:  Do these exercises 4 or 5 days a week, or as directed  · Standing half squats:  Stand with your feet shoulder-width apart  Lean your back against a wall or hold the back of a chair for balance, if needed  Slowly sit down about 10 inches, as if you are going to sit in a chair  Your body weight should be mostly over your heels  Hold the squat for 5 seconds, then rise to a standing position  Do 3 sets of 10 squats to strengthen your buttocks and thighs  · Standing hamstring curls: Face a wall and place both palms flat on the wall, or hold the back of a chair for balance  With your weight on one leg, lift your other foot as close to your buttocks as you can  Hold for 5 seconds and then lower your leg  Do 2 sets of 10 curls on each leg  This exercise strengthens the muscles in the back of your thigh  · Standing calf raises:  Face a wall and place both palms flat on the wall, or hold the back of a chair for balance  Stand up straight, and do not lean  Place all your weight on one leg by lifting the other foot off the floor  Raise the heel of the foot that is on the floor as high as you can and then lower it  Do 2 sets of 10 calf raises on each leg to strengthen your calf muscles  · Straight leg lifts:  Lie on your stomach with straight legs  Fold your arms in front of you and rest your head in your arms  Tighten your leg muscles and raise one leg as high as you can  Hold for 5 seconds, then lower your leg  Do 2 sets of 10 lifts on each leg to strengthen your buttocks  · Sitting leg lifts:  Sit in a chair  Slowly straighten and raise one leg  Squeeze your thigh muscles and hold for 5 seconds  Relax and return your foot to the floor  Do 2 sets of 10 lifts on each leg  This helps strengthen the muscles in the front of your thigh  Contact your healthcare provider if:   · You have new pain or your pain becomes worse  · You have questions or concerns about your condition or care  © Copyright [employer] 2021 Information is for End User's use only and may not be sold, redistributed or otherwise used for commercial purposes  All illustrations and images included in CareNotes® are the copyrighted property of [employer]  or [facility] Geoff Honeycutt   The above information is an  only  It is not intended as medical advice for individual conditions or treatments  Talk to your doctor, nurse or pharmacist before following any medical regimen to see if it is safe and effective for you

## 2022-01-19 NOTE — PROGRESS NOTES
Assessment  1  Stage 3 chronic kidney disease, unspecified whether stage 3a or 3b CKD ([facility] [address] )    2  Lumbar radiculopathy - Bilateral  -     gabapentin (NEURONTIN) 300 mg capsule; Take 1 capsule (300 mg total) by mouth 3 (three) times a day    3  Primary osteoarthritis of right knee - Right    Greater than 90% relief of pain with improved ability to participate with IADLs after right genicular nerve blocks (3) for approximately 2 days and with subsequent radiofrequency ablation; previously described more achy, nagging, indolent, crampy throbbing stabbing pain with weight bearing in right knee where xray reveals moderate degenerative changes in the medial and anterior portions of right knee  At PT joint was overworked and patient reports swelling    Greater than 90% relief of pain with improved ability to participate with IADLs after ILESI at L5-S1 for 2 weeks; similar result with caudal carlitos resulting in 2 weeks of relief; pain has returned and patient has surgery scheduled decompressive intervention  Chronic and progressive lumbar radicular pain in L5 and S1 dermatomal distributions accompanied by weakness numbness and paresthesias  Multilevel lumbar degenerative disc disease most pronounced at L4-5 where there is a central and right paramedian disc herniation/extrusion resulting in moderate canal stenosis  Symptoms consistent with lumbar spinal stenosis with neurogenic claudication  Facet joint injections did not provide significant relief in the past  Pain relieved by leaning forward, pausing for a few moments before resuming ambulation  +SLR bilaterally, otherwise 5/5 strength in all extremities with AROM  Will obtain imaging to guide interventional therapy  Risks, benefits and alternatives to medications and CARLITOS discussed with patient      Plan  -f/u with NSGY; surgery scheduled 2/4/22  -may return to our clinic prn  -gabapentin 300 mg t i d  Ordered for patient; counseled regarding sedative effects of taking this medication and provided up titration calendar  Counseled not to take medication while driving or operating heavy machinery/using stairs  -meloxicam 7 5 mg b i d  tapered and replaced with voltaren gel to be applied to right knee  Patient educated regarding bleeding risk of taking this medication not taking any other nonsteroidal anti-inflammatory medications while taking this medication; counseled thoroughly regarding potential risk of Cardiovascular injury, Kidney injury, Gastrointestinal ulceration/bleeding  Patient voiced understanding  -physical therapy for lumbar radiculopathy, right knee pain; Core and knee exercises additionally provided for physician directed home PT that the patient plans to participate with for 1 hour, twice a week for the next 6 weeks  There are risks associated with opioid medications, including dependence, addiction and tolerance  The patient understands and agrees to use these medications only as prescribed  Potential side effects of the medications include, but are not limited to, constipation, drowsiness, addiction, impaired judgment and risk of fatal overdose if not taken as prescribed  The patient was warned against driving while taking sedation medications  Sharing medications is a felony  At this point in time, the patient is showing no signs of addiction, abuse, diversion or suicidal ideation  [place] Prescription Drug Monitoring Program report was reviewed and was appropriate     Complete risks and benefits including bleeding, infection, tissue reaction, nerve injury and allergic reaction were discussed  The approach was demonstrated using models and literature was provided  Verbal and written consent was obtained  My impressions and treatment recommendations were discussed in detail with the patient who verbalized understanding and had no further questions  Discharge instructions were provided   I personally saw and examined the patient and I agree with the above discussed plan of care  New Medications Ordered This Visit   Medications    gabapentin (NEURONTIN) 300 mg capsule     Sig: Take 1 capsule (300 mg total) by mouth 3 (three) times a day     Dispense:  90 capsule     Refill:  0       History of Present Illness    Greater than 90% relief of pain with improved ability to participate with IADLs after right genicular nerve blocks (3) for approximately 2 days and with subsequent radiofrequency ablation; previously described more achy, nagging, indolent, crampy throbbing stabbing pain with weight bearing in right knee where xray reveals moderate degenerative changes in the medial and anterior portions of right knee  At PT joint was overworked and patient reports swelling    Greater than 90% relief of pain with improved ability to participate with IADLs after ILESI at L5-S1 for 2 weeks; similar result with caudal carlitos resulting in 2 weeks of relief; pain has returned and patient has surgery scheduled decompressive intervention  Kumar Bermudez is a 64 y o  male  With pmhx of xol, htn, dm-2, smoking presenting with a long history of chronic lumbar radicular pain in an L5 and S1 dermatomal distributions  Debilitating weakness numbness and paresthesias accompany the pain  The patient rates the pain at a 8/10 accompanied by electric shock-like shooting features and crampy burning pain in the aforementioned dermatomal distributions  The pain is worse in the mornings as well as the end of the day; exertion such as walking for long periods of time seems to exacerbate the pain  The patient can hardly walk more than a few blocks without having debilitating pain  He tries to maintain an active lifestyle and finds that the current degree of pain seems to compromises his efforts  The pain significantly impacts independent activities of daily living and contributes to significant disability    He has attempted physical therapy without significant benefit in the past   He has taken celebrex with limited relief of the pain as well  He has never tried epidural steroid injections in the past but has previously trialed facet joint injections without significant benefit  He denies any bowel or bladder dysfunction/incontinence  I have personally reviewed and/or updated the patient's past medical history, past surgical history, family history, social history, current medications, allergies, and vital signs today  Review of Systems   Constitutional: Positive for activity change  HENT: Negative  Eyes: Negative  Respiratory: Negative  Cardiovascular: Negative  Gastrointestinal: Negative  Endocrine: Negative  Genitourinary: Negative  Musculoskeletal: Positive for arthralgias, back pain, gait problem and myalgias  Skin: Negative  Allergic/Immunologic: Negative  Neurological: Positive for weakness and numbness  Hematological: Negative  Psychiatric/Behavioral: Negative  All other systems reviewed and are negative        Patient Active Problem List   Diagnosis    Lumbar radiculopathy - Bilateral    Spinal stenosis of lumbar region with neurogenic claudication    Primary osteoarthritis of right knee - Right    Stage 3 chronic kidney disease, unspecified whether stage 3a or 3b CKD ([facility] [address] )       Past Medical History:   Diagnosis Date    AV block, 1st degree     BPH (benign prostatic hyperplasia)     [facility]     Diabetes mellitus ([facility] )     takes metformin    Hyperlipidemia     Hypertension        Past Surgical History:   Procedure Laterality Date    CATARACT EXTRACTION, BILATERAL      COLONOSCOPY      EPIDURAL BLOCK INJECTION N/A 10/19/2021    Procedure: L5 S1 INTERLAMINAR EPIDURAL STEROID INJECTION;  Surgeon: Kd Brooks MD;  [facility];  Service: Pain Management     EYE SURGERY      KIDNEY STONE SURGERY      PROSTATE SURGERY      RADIOFREQUENCY ABLATION Right 1/4/2022    Procedure: right genicular nerve radiofrequency ablation (3);  Surgeon: Ilda Huerta MD;  Location: [facility];  Service: Pain Management     TONSILLECTOMY AND ADENOIDECTOMY      TRANSURETHRAL RESECTION OF PROSTATE         Family History   Problem Relation Age of Onset    Diabetes Mother     No Known Problems Father        Social History     Occupational History    Not on file   Tobacco Use    Smoking status: Current Every Day Smoker     Packs/day: 0 50     Years: 15 00     Pack years: 7 50    Smokeless tobacco: Never Used   Vaping Use    Vaping Use: Never used   Substance and Sexual Activity    Alcohol use: Never    Drug use: Never    Sexual activity: Not on file       Current Outpatient Medications on File Prior to Visit   Medication Sig    aspirin (ECOTRIN LOW STRENGTH) 81 mg EC tablet Take 81 mg by mouth daily      b complex vitamins capsule Take 1 capsule by mouth as needed    Diclofenac Sodium (VOLTAREN) 1 % Apply 2 g topically 4 (four) times a day    hydrochlorothiazide (HYDRODIURIL) 25 mg tablet Take by mouth    lisinopril (ZESTRIL) 10 mg tablet Take by mouth      Specialty Vitamins Products (PROSTATE PO) Take by mouth in the morning    tadalafil (CIALIS) 20 MG tablet Take 20 mg by mouth as needed      TURMERIC PO Take by mouth in the morning    Vitamin D-Vitamin K (Vitamin K2-Vitamin D3)  MCG-UNIT CAPS Take by mouth in the morning    Empagliflozin 10 MG TABS 10 mg    lisinopril (ZESTRIL) 40 mg tablet Take 40 mg by mouth daily      metFORMIN (GLUCOPHAGE) 1000 MG tablet Take 1,000 mg by mouth daily with breakfast      [DISCONTINUED] gabapentin (NEURONTIN) 300 mg capsule Take 1 capsule (300 mg total) by mouth 3 (three) times a day (Patient not taking: Reported on 12/30/2021 )     No current facility-administered medications on file prior to visit         Allergies   Allergen Reactions    Simvastatin Other (See Comments)     Muscle weakness/joint pain         Physical Exam    /62 Pulse 59   Temp (!) 97 °F (36 1 °C)   Resp 20   Ht 5' 11" (1 803 m)   Wt 92 kg (202 lb 12 8 oz)   BMI 28 28 kg/m²     Constitutional: normal, well developed, well nourished, alert, in no distress and non-toxic and no overt pain behavior  and overweight  Eyes: anicteric  HEENT: grossly intact  Neck: supple, symmetric, trachea midline and no masses   Pulmonary:even and unlabored  Cardiovascular:No edema or pitting edema present  Skin:Normal without rashes or lesions and well hydrated  Psychiatric:Mood and affect appropriate  Neurologic:Cranial Nerves II-XII grossly intact Sensation grossly intact; no clonus negative hunt's  Reflexes 2+ and brisk  SLR positive bilaterally  Musculoskeletal:normal gait  5/5 strength in all extremities with AROM bilaterally  Normal heel toe and tip toe walking  pain with lumbar facet loading bilaterally and with lateral spine rotation  ttp over lumbar paraspinal muscles  Negative gerry's test, negative gaenslen's negative SIJ loading bilaterally  Imaging    Outside xrays reviewed of knee and pelvis showing moderate degenerative changes  MRI LUMBAR SPINE WITHOUT CONTRAST     INDICATION: M54 16: Radiculopathy, lumbar region      COMPARISON:  None      TECHNIQUE:  Sagittal T1, sagittal T2, sagittal inversion recovery, axial T1 and axial T2, coronal T2     IMAGE QUALITY:  Diagnostic     FINDINGS:     VERTEBRAL BODIES:  There are 5 lumbar type vertebral bodies  Normal alignment of the lumbar spine  No spondylolysis or spondylolisthesis  No scoliosis  No compression fracture  Multiple hemangiomas are seen within the lower thoracic and lumbar   vertebral bodies      SACRUM:  Normal signal within the sacrum   No evidence of insufficiency or stress fracture      DISTAL CORD AND CONUS:  Normal size and signal within the distal cord and conus      PARASPINAL SOFT TISSUES:  There is an exophytic cyst arising from the lower pole the right kidney incompletely imaged on this examination  This measures at least 8 5 cm in craniocaudad dimension      LOWER THORACIC DISC SPACES:  Normal disc height and signal   No disc herniation, canal stenosis or foraminal narrowing      LUMBAR DISC SPACES:     L1-L2:  Mild annular bulging with anterior osteophyte formation  No disc herniation  No canal stenosis or foraminal nerve impingement      L2-L3:  Mild annular bulging  Mild endplate degenerative change  Trace facet effusions  Mild canal stenosis and bilateral foraminal narrowing      L3-L4:  Mild diffuse annular bulging  Mild canal stenosis and bilateral foraminal narrowing      L4-L5:  There is loss of disc height with annular bulging and a small central disc protrusion  Slight superior and inferior extrusion to the right of midline  Mild right greater than left facet degenerative change  Moderate central canal stenosis with   distortion of the thecal sac  Mild bilateral foraminal narrowing      L5-S1:  Minor annular bulging and facet degenerative change  No disc herniation, canal stenosis or foraminal nerve impingement      IMPRESSION:     Multilevel lumbar degenerative disc disease most pronounced at L4-5 where there is a central and right paramedian disc herniation/extrusion resulting in moderate canal stenosis

## 2022-01-26 ENCOUNTER — ANESTHESIA EVENT (OUTPATIENT)
Dept: PERIOP | Facility: HOSPITAL | Age: 77
End: 2022-01-26
Payer: MEDICARE

## 2022-01-26 RX ORDER — EZETIMIBE 10 MG/1
10 TABLET ORAL DAILY
COMMUNITY

## 2022-01-26 NOTE — PRE-PROCEDURE INSTRUCTIONS
Pre-Surgery Instructions:   Medication Instructions    aspirin (ECOTRIN LOW STRENGTH) 81 mg EC tablet Patient was instructed by Physician and understands  Stopped 1/26    b complex vitamins capsule Instructed patient per Anesthesia Guidelines  Stop 2/28    Cyanocobalamin (VITAMIN B-12 PO) Instructed patient per Anesthesia Guidelines  Stop 2/28    Empagliflozin 10 MG TABS Instructed patient per Anesthesia Guidelines  Do not take morning of surgery    ezetimibe (ZETIA) 10 mg tablet Instructed patient per Anesthesia Guidelines  Take morning of surgery with sip water    gabapentin (NEURONTIN) 300 mg capsule Instructed patient per Anesthesia Guidelines  Take morning of surgery with sip water    hydrochlorothiazide (HYDRODIURIL) 25 mg tablet Instructed patient per Anesthesia Guidelines  Do not take morning of surgery    lisinopril (ZESTRIL) 40 mg tablet Instructed patient per Anesthesia Guidelines  Do not take morning of surgery    metFORMIN (GLUCOPHAGE) 1000 MG tablet Instructed patient per Anesthesia Guidelines  Do not take morning of surgery    NIACIN PO Instructed patient per Anesthesia Guidelines  Stop 2/28    Specialty Vitamins Products (PROSTATE PO) Instructed patient per Anesthesia Guidelines  Stop 2/28    tadalafil (CIALIS) 20 MG tablet do not take morning of surgery    TURMERIC PO Instructed patient per Anesthesia Guidelines  Stop 2/28    Vitamin D-Vitamin K (Vitamin K2-Vitamin D3)  MCG-UNIT CAPS Instructed patient per Anesthesia Guidelines  Stop 2/28   Covid screening negative as per patient  Fully vaccinated  Advised to notify surgeon if covid sx develop  Reviewed pre op medicine and showering instructions with patient via phone call, verbalizes understanding  Advised patient to stop taking non prescribed vitamins, herbal meds and ASA 7 days pre op  Advised to stop taking NSAID's 3 days pre op but may take Tylenol products if needed       Advised NPO after MN (2/3) and surgical services will call (2/3) with scheduled time of hospital arrival     Advised to abstain from smoking 24 hrs pre op  Pt  not interested in smoking cessation education

## 2022-01-27 NOTE — ANESTHESIA PREPROCEDURE EVALUATION
Procedure:  Metrx L4-5 left hemilaminectomy and bilateral foraminotomy (Left Spine Lumbar)    Relevant Problems   /RENAL   (+) Stage 3 chronic kidney disease, unspecified whether stage 3a or 3b CKD (HCC)      MUSCULOSKELETAL   (+) Primary osteoarthritis of right knee - Right      Pain doc: somashekar  Pain regimen: gabapentin, meloxicam  Tylenol and gabapentin ordered preop

## 2022-02-03 ENCOUNTER — DOCUMENTATION (OUTPATIENT)
Dept: NEUROSURGERY | Facility: CLINIC | Age: 77
End: 2022-02-03

## 2022-02-04 ENCOUNTER — APPOINTMENT (OUTPATIENT)
Dept: RADIOLOGY | Facility: HOSPITAL | Age: 77
End: 2022-02-04
Payer: MEDICARE

## 2022-02-04 ENCOUNTER — HOSPITAL ENCOUNTER (OUTPATIENT)
Facility: HOSPITAL | Age: 77
Setting detail: OUTPATIENT SURGERY
Discharge: HOME | End: 2022-02-04
Attending: NEUROLOGICAL SURGERY | Admitting: NEUROLOGICAL SURGERY
Payer: MEDICARE

## 2022-02-04 ENCOUNTER — ANESTHESIA (OUTPATIENT)
Dept: PERIOP | Facility: HOSPITAL | Age: 77
End: 2022-02-04
Payer: MEDICARE

## 2022-02-04 VITALS
SYSTOLIC BLOOD PRESSURE: 141 MMHG | HEART RATE: 55 BPM | RESPIRATION RATE: 16 BRPM | TEMPERATURE: 96.3 F | WEIGHT: 200 LBS | OXYGEN SATURATION: 94 % | BODY MASS INDEX: 28 KG/M2 | HEIGHT: 71 IN | DIASTOLIC BLOOD PRESSURE: 71 MMHG

## 2022-02-04 DIAGNOSIS — M54.16 LUMBAR RADICULOPATHY: Primary | ICD-10-CM

## 2022-02-04 DIAGNOSIS — M48.062 SPINAL STENOSIS OF LUMBAR REGION WITH NEUROGENIC CLAUDICATION: ICD-10-CM

## 2022-02-04 LAB
GLUCOSE SERPL-MCNC: 188 MG/DL (ref 65–140)
GLUCOSE SERPL-MCNC: 197 MG/DL (ref 65–140)

## 2022-02-04 PROCEDURE — 82948 REAGENT STRIP/BLOOD GLUCOSE: CPT

## 2022-02-04 PROCEDURE — 72020 X-RAY EXAM OF SPINE 1 VIEW: CPT

## 2022-02-04 PROCEDURE — 63047 LAM FACETEC & FORAMOT LUMBAR: CPT | Performed by: NEUROLOGICAL SURGERY

## 2022-02-04 RX ORDER — CEFAZOLIN SODIUM 2 G/50ML
2000 SOLUTION INTRAVENOUS ONCE
Status: COMPLETED | OUTPATIENT
Start: 2022-02-04 | End: 2022-02-04

## 2022-02-04 RX ORDER — ONDANSETRON 2 MG/ML
4 INJECTION INTRAMUSCULAR; INTRAVENOUS ONCE AS NEEDED
Status: DISCONTINUED | OUTPATIENT
Start: 2022-02-04 | End: 2022-02-04 | Stop reason: HOSPADM

## 2022-02-04 RX ORDER — HYDROMORPHONE HCL/PF 1 MG/ML
0.5 SYRINGE (ML) INJECTION
Status: DISCONTINUED | OUTPATIENT
Start: 2022-02-04 | End: 2022-02-04 | Stop reason: HOSPADM

## 2022-02-04 RX ORDER — AMOXICILLIN 250 MG
1 CAPSULE ORAL 2 TIMES DAILY
Status: DISCONTINUED | OUTPATIENT
Start: 2022-02-04 | End: 2022-02-04 | Stop reason: HOSPADM

## 2022-02-04 RX ORDER — LIDOCAINE HYDROCHLORIDE 10 MG/ML
INJECTION, SOLUTION EPIDURAL; INFILTRATION; INTRACAUDAL; PERINEURAL AS NEEDED
Status: DISCONTINUED | OUTPATIENT
Start: 2022-02-04 | End: 2022-02-04

## 2022-02-04 RX ORDER — LIDOCAINE HYDROCHLORIDE AND EPINEPHRINE 10; 10 MG/ML; UG/ML
INJECTION, SOLUTION INFILTRATION; PERINEURAL AS NEEDED
Status: DISCONTINUED | OUTPATIENT
Start: 2022-02-04 | End: 2022-02-04 | Stop reason: HOSPADM

## 2022-02-04 RX ORDER — ACETAMINOPHEN 325 MG/1
975 TABLET ORAL EVERY 8 HOURS SCHEDULED
Refills: 0
Start: 2022-02-04

## 2022-02-04 RX ORDER — GLYCOPYRROLATE 0.2 MG/ML
INJECTION INTRAMUSCULAR; INTRAVENOUS AS NEEDED
Status: DISCONTINUED | OUTPATIENT
Start: 2022-02-04 | End: 2022-02-04

## 2022-02-04 RX ORDER — PROPOFOL 10 MG/ML
INJECTION, EMULSION INTRAVENOUS AS NEEDED
Status: DISCONTINUED | OUTPATIENT
Start: 2022-02-04 | End: 2022-02-04

## 2022-02-04 RX ORDER — BUPIVACAINE HYDROCHLORIDE AND EPINEPHRINE 5; 5 MG/ML; UG/ML
INJECTION, SOLUTION EPIDURAL; INTRACAUDAL; PERINEURAL AS NEEDED
Status: DISCONTINUED | OUTPATIENT
Start: 2022-02-04 | End: 2022-02-04 | Stop reason: HOSPADM

## 2022-02-04 RX ORDER — SODIUM CHLORIDE 9 MG/ML
100 INJECTION, SOLUTION INTRAVENOUS CONTINUOUS
Status: CANCELLED | OUTPATIENT
Start: 2022-02-04

## 2022-02-04 RX ORDER — NEOSTIGMINE METHYLSULFATE 1 MG/ML
INJECTION INTRAVENOUS AS NEEDED
Status: DISCONTINUED | OUTPATIENT
Start: 2022-02-04 | End: 2022-02-04

## 2022-02-04 RX ORDER — SODIUM CHLORIDE 9 MG/ML
INJECTION, SOLUTION INTRAVENOUS CONTINUOUS PRN
Status: DISCONTINUED | OUTPATIENT
Start: 2022-02-04 | End: 2022-02-04

## 2022-02-04 RX ORDER — METHOCARBAMOL 500 MG/1
500 TABLET, FILM COATED ORAL 4 TIMES DAILY
Qty: 28 TABLET | Refills: 0 | Status: SHIPPED | OUTPATIENT
Start: 2022-02-04 | End: 2022-03-04

## 2022-02-04 RX ORDER — ACETAMINOPHEN 325 MG/1
975 TABLET ORAL EVERY 8 HOURS SCHEDULED
Status: DISCONTINUED | OUTPATIENT
Start: 2022-02-04 | End: 2022-02-04 | Stop reason: HOSPADM

## 2022-02-04 RX ORDER — OXYCODONE HYDROCHLORIDE 5 MG/1
5 TABLET ORAL EVERY 6 HOURS PRN
Qty: 28 TABLET | Refills: 0 | Status: SHIPPED | OUTPATIENT
Start: 2022-02-04 | End: 2022-02-11

## 2022-02-04 RX ORDER — FENTANYL CITRATE/PF 50 MCG/ML
50 SYRINGE (ML) INJECTION
Status: DISCONTINUED | OUTPATIENT
Start: 2022-02-04 | End: 2022-02-04 | Stop reason: HOSPADM

## 2022-02-04 RX ORDER — MAGNESIUM HYDROXIDE 1200 MG/15ML
LIQUID ORAL AS NEEDED
Status: DISCONTINUED | OUTPATIENT
Start: 2022-02-04 | End: 2022-02-04 | Stop reason: HOSPADM

## 2022-02-04 RX ORDER — CHLORHEXIDINE GLUCONATE 0.12 MG/ML
15 RINSE ORAL ONCE
Status: COMPLETED | OUTPATIENT
Start: 2022-02-04 | End: 2022-02-04

## 2022-02-04 RX ORDER — SODIUM CHLORIDE, SODIUM LACTATE, POTASSIUM CHLORIDE, CALCIUM CHLORIDE 600; 310; 30; 20 MG/100ML; MG/100ML; MG/100ML; MG/100ML
50 INJECTION, SOLUTION INTRAVENOUS CONTINUOUS
Status: CANCELLED | OUTPATIENT
Start: 2022-02-04

## 2022-02-04 RX ORDER — FENTANYL CITRATE 50 UG/ML
INJECTION, SOLUTION INTRAMUSCULAR; INTRAVENOUS AS NEEDED
Status: DISCONTINUED | OUTPATIENT
Start: 2022-02-04 | End: 2022-02-04

## 2022-02-04 RX ORDER — OXYCODONE HYDROCHLORIDE 5 MG/1
2.5 TABLET ORAL EVERY 4 HOURS PRN
Status: DISCONTINUED | OUTPATIENT
Start: 2022-02-04 | End: 2022-02-04 | Stop reason: HOSPADM

## 2022-02-04 RX ORDER — METHYLPREDNISOLONE ACETATE 40 MG/ML
INJECTION, SUSPENSION INTRA-ARTICULAR; INTRALESIONAL; INTRAMUSCULAR; SOFT TISSUE AS NEEDED
Status: DISCONTINUED | OUTPATIENT
Start: 2022-02-04 | End: 2022-02-04 | Stop reason: HOSPADM

## 2022-02-04 RX ORDER — OXYCODONE HYDROCHLORIDE 5 MG/1
5 TABLET ORAL EVERY 4 HOURS PRN
Status: DISCONTINUED | OUTPATIENT
Start: 2022-02-04 | End: 2022-02-04 | Stop reason: HOSPADM

## 2022-02-04 RX ORDER — ONDANSETRON 2 MG/ML
INJECTION INTRAMUSCULAR; INTRAVENOUS AS NEEDED
Status: DISCONTINUED | OUTPATIENT
Start: 2022-02-04 | End: 2022-02-04

## 2022-02-04 RX ORDER — PROPOFOL 10 MG/ML
INJECTION, EMULSION INTRAVENOUS CONTINUOUS PRN
Status: DISCONTINUED | OUTPATIENT
Start: 2022-02-04 | End: 2022-02-04

## 2022-02-04 RX ORDER — ACETAMINOPHEN 325 MG/1
975 TABLET ORAL ONCE
Status: COMPLETED | OUTPATIENT
Start: 2022-02-04 | End: 2022-02-04

## 2022-02-04 RX ORDER — AMOXICILLIN 250 MG
1 CAPSULE ORAL 2 TIMES DAILY
Qty: 14 TABLET | Refills: 0 | Status: SHIPPED | OUTPATIENT
Start: 2022-02-04 | End: 2022-03-17

## 2022-02-04 RX ORDER — EPHEDRINE SULFATE 50 MG/ML
INJECTION INTRAVENOUS AS NEEDED
Status: DISCONTINUED | OUTPATIENT
Start: 2022-02-04 | End: 2022-02-04

## 2022-02-04 RX ORDER — GABAPENTIN 300 MG/1
300 CAPSULE ORAL ONCE
Status: DISCONTINUED | OUTPATIENT
Start: 2022-02-04 | End: 2022-02-04 | Stop reason: HOSPADM

## 2022-02-04 RX ORDER — ONDANSETRON 2 MG/ML
4 INJECTION INTRAMUSCULAR; INTRAVENOUS EVERY 4 HOURS PRN
Status: DISCONTINUED | OUTPATIENT
Start: 2022-02-04 | End: 2022-02-04 | Stop reason: HOSPADM

## 2022-02-04 RX ORDER — ROCURONIUM BROMIDE 10 MG/ML
INJECTION, SOLUTION INTRAVENOUS AS NEEDED
Status: DISCONTINUED | OUTPATIENT
Start: 2022-02-04 | End: 2022-02-04

## 2022-02-04 RX ORDER — SODIUM CHLORIDE, SODIUM LACTATE, POTASSIUM CHLORIDE, CALCIUM CHLORIDE 600; 310; 30; 20 MG/100ML; MG/100ML; MG/100ML; MG/100ML
INJECTION, SOLUTION INTRAVENOUS CONTINUOUS PRN
Status: DISCONTINUED | OUTPATIENT
Start: 2022-02-04 | End: 2022-02-04

## 2022-02-04 RX ADMIN — CEFAZOLIN SODIUM 2000 MG: 2 SOLUTION INTRAVENOUS at 07:49

## 2022-02-04 RX ADMIN — CHLORHEXIDINE GLUCONATE 15 ML: 1.2 SOLUTION ORAL at 05:53

## 2022-02-04 RX ADMIN — ROCURONIUM BROMIDE 40 MG: 50 INJECTION, SOLUTION INTRAVENOUS at 07:37

## 2022-02-04 RX ADMIN — PHENYLEPHRINE HYDROCHLORIDE 40 MCG/MIN: 10 INJECTION INTRAVENOUS at 08:02

## 2022-02-04 RX ADMIN — LIDOCAINE HYDROCHLORIDE 50 MG: 10 INJECTION, SOLUTION EPIDURAL; INFILTRATION; INTRACAUDAL; PERINEURAL at 07:35

## 2022-02-04 RX ADMIN — NEOSTIGMINE METHYLSULFATE 3 MG: 1 INJECTION INTRAVENOUS at 08:51

## 2022-02-04 RX ADMIN — PROPOFOL 100 MG: 10 INJECTION, EMULSION INTRAVENOUS at 07:36

## 2022-02-04 RX ADMIN — ACETAMINOPHEN 975 MG: 325 TABLET, FILM COATED ORAL at 05:52

## 2022-02-04 RX ADMIN — GLYCOPYRROLATE 0.4 MG: 0.2 INJECTION, SOLUTION INTRAMUSCULAR; INTRAVENOUS at 08:51

## 2022-02-04 RX ADMIN — OXYCODONE HYDROCHLORIDE 5 MG: 5 TABLET ORAL at 10:40

## 2022-02-04 RX ADMIN — EPHEDRINE SULFATE 5 MG: 50 INJECTION, SOLUTION INTRAVENOUS at 08:09

## 2022-02-04 RX ADMIN — ONDANSETRON 4 MG: 2 INJECTION INTRAMUSCULAR; INTRAVENOUS at 08:17

## 2022-02-04 RX ADMIN — REMIFENTANIL HYDROCHLORIDE 0.1 MCG/KG/MIN: 1 INJECTION, POWDER, LYOPHILIZED, FOR SOLUTION INTRAVENOUS at 07:59

## 2022-02-04 RX ADMIN — SODIUM CHLORIDE: 0.9 INJECTION, SOLUTION INTRAVENOUS at 07:46

## 2022-02-04 RX ADMIN — PROPOFOL 50 MCG/KG/MIN: 10 INJECTION, EMULSION INTRAVENOUS at 07:59

## 2022-02-04 RX ADMIN — SODIUM CHLORIDE, SODIUM LACTATE, POTASSIUM CHLORIDE, AND CALCIUM CHLORIDE: .6; .31; .03; .02 INJECTION, SOLUTION INTRAVENOUS at 07:20

## 2022-02-04 RX ADMIN — PROPOFOL 50 MG: 10 INJECTION, EMULSION INTRAVENOUS at 07:39

## 2022-02-04 RX ADMIN — ROCURONIUM BROMIDE 10 MG: 50 INJECTION, SOLUTION INTRAVENOUS at 08:23

## 2022-02-04 RX ADMIN — FENTANYL CITRATE 50 MCG: 50 INJECTION INTRAMUSCULAR; INTRAVENOUS at 07:35

## 2022-02-04 RX ADMIN — EPHEDRINE SULFATE 10 MG: 50 INJECTION, SOLUTION INTRAVENOUS at 08:07

## 2022-02-04 NOTE — ANESTHESIA POST-OP FOLLOW-UP NOTE
Patient: Vannie Fraction  Procedure(s):  Metrx L4-5 left hemilaminectomy and bilateral foraminotomy  Vitals:    02/04/22 1052   BP: 141/71   Pulse: 55   Resp: 16   Temp: (!) 96 3 °F (35 7 °C)   SpO2:

## 2022-02-04 NOTE — ANESTHESIA PREPROCEDURE EVALUATION
Procedure:  Metrx L4-5 left hemilaminectomy and bilateral foraminotomy (Left Spine Lumbar)    Relevant Problems   /RENAL   (+) Stage 3 chronic kidney disease, unspecified whether stage 3a or 3b CKD (HCC)      MUSCULOSKELETAL   (+) Primary osteoarthritis of right knee - Right        Physical Exam    Airway    Mallampati score: II         Dental       Cardiovascular  Rhythm: regular, Rate: normal, Cardiovascular exam normal    Pulmonary  Pulmonary exam normal     Other Findings        Anesthesia Plan  ASA Score- 3     Anesthesia Type- general with ASA Monitors  Additional Monitors:   Airway Plan: ETT  Plan Factors-Exercise tolerance (METS): >4 METS  Chart reviewed  EKG reviewed  Existing labs reviewed  Patient summary reviewed  Patient is not a current smoker  Patient did not smoke on day of surgery  Induction- intravenous  Postoperative Plan- Plan for postoperative opioid use  Informed Consent- Anesthetic plan and risks discussed with patient

## 2022-02-04 NOTE — INTERVAL H&P NOTE
H&P reviewed  After examining the patient I find no changes in the patients condition since the H&P had been written      Vitals:    02/04/22 0548   BP: 103/68   Pulse: 66   Resp: 16   Temp: (!) 97 2 °F (36 2 °C)   SpO2: 97%

## 2022-02-04 NOTE — OP NOTE
PERATIVE REPORT  PATIENT NAME: Vidya Baez    :  1945  MRN: [id number]  Pt Location:  OR ROOM 09    SURGERY DATE: 2022    Surgeon(s) and Role:     * Jessee Dennison MD - Primary    Preop Diagnosis:  Spinal stenosis of lumbar region with neurogenic claudication [M48 062]  Lumbar radiculopathy [M54 16]    Post-Op Diagnosis Codes:     * Spinal stenosis of lumbar region with neurogenic claudication [M48 062]     * Lumbar radiculopathy [M54 16]    Procedure(s) (LRB):  Metrx L4-5 left hemilaminectomy and bilateral foraminotomy (Left)    Specimen(s):  * No specimens in log *    Estimated Blood Loss:   Minimal    Drains:  * No LDAs found *    Anesthesia Type:   General    Operative Indications:  Spinal stenosis of lumbar region with neurogenic claudication [M48 062]  Lumbar radiculopathy [M54 16]    Operative Findings:  Spinal stenosis    Complications:   None    Procedure and Technique:  After adequate general endotracheal anesthesia the patient was placed prone on the operating room table  The back was prepped with Betadine soap and then DuraPrep double layer drapes placed normal fashion and 3 and Betadine impregnated sticky drape was placed over these  A timeout was called all parameters the timeout were followed  The K wire was placed to the facet overlying the L4-5 interspace on the left side  Intraoperative fluoroscopy was used throughout the case to either identification of level  There was radiology over-read for level verification  The skin was then infiltrated with 1% lidocaine with 1 100,000 epinephrine  A #15 blade was used to incise the skin  Bovie and bipolar were used throughout the procedure to maintain operative exposure  Intraoperative microscope was used at various degrees of magnification to aid in the Identification, illumination, and microdissection necessary to perform this procedure    Progressive dilators were placed, the sheath was placed and this was affixed to the bedside connector  The Midas Boo drill was utilized to perform a hemilaminotomy  A medial facetectomy and decompressive foraminotomy were performed with the use of Kerrison rongeurs, foraminotomy rongeurs and curved curettes  A complete decompressive foraminotomy was performed on the left side utilizing the foraminotomy rongeurs and Kerrison rongeurs  A disc herniation was identified  The annulus was incised  Small fragments of the disc were removed  Copious quantities of irrigation were used to cleanse out the region  There were no CSF leaks  40 mg of Depo-Medrol was placed a small piece of Gelfoam was placed over this  The retractors were removed  The fascia was approximated with interrupted 3-0 Vicryl suture  The skin was closed with interrupted inverted 3-0 Vicryl suture  0 5% bupivacaine with 1: 200,000 epinephrine was infiltrated into the surrounding tissues  Benzoin and Steri-Strips are placed and sterile dressing was placed    The patient was taken to the recovery room having tolerated procedure well   I was present for the entire procedure    Patient Disposition:  PACU       SIGNATURE: Dionna Davis MD  DATE: February 4, 2022  TIME: 9:08 AM

## 2022-02-04 NOTE — ANESTHESIA POSTPROCEDURE EVALUATION
Post-Op Assessment Note    CV Status:  Stable  Pain Score: 0    Pain management: adequate     Mental Status:  Alert and awake   Hydration Status:  Euvolemic   PONV Controlled:  Controlled   Airway Patency:  Patent      Post Op Vitals Reviewed: Yes      Staff: Anesthesiologist, CRNA         No complications documented      /55 (02/04/22 0915)    Temp     Pulse 64 (02/04/22 0915)   Resp (!) 24 (02/04/22 0915)    SpO2 98 % (02/04/22 0915)

## 2022-02-07 ENCOUNTER — TELEPHONE (OUTPATIENT)
Dept: NEUROSURGERY | Facility: CLINIC | Age: 77
End: 2022-02-07

## 2022-02-07 NOTE — TELEPHONE ENCOUNTER
Called patient to see how he is doing after surgery  Patient reports he is doing well overall and denies any incisional issues or fevers  Reports his presurgical symptoms have completely resolved and he is very pleased with the reults so far  Has not needed pain medication since Saturday  Patient is able to ambulate around the house and complete ADLs  Educated the patient about the importance of preventing blood clots and provided measures how to prevent them  Patient has moved his bowels since the surgery  Encouraged patient to take an over the counter stool softener, if he is taking narcotic pain medication  Encouraged fiber intake and fluids  Reviewed incision care with the patient  Advised that after three days he may take a shower and gently wash the surgical site with soap and water  Use clean wash cloth, towels, and clothing  Do not submerge in water until cleared by the surgeon  Do not apply any creams, ointments, or lotions to the site  Patient is aware to call the office if any redness, swelling, drainage, dehiscence of incision, or fever >100 F occurs  Patient is aware to call the office if any concerns or questions may arise  Reminded patient of his upcoming appointments with the date/time/location  Patient was appreciative for the call

## 2022-02-18 ENCOUNTER — CLINICAL SUPPORT (OUTPATIENT)
Dept: NEUROSURGERY | Facility: CLINIC | Age: 77
End: 2022-02-18

## 2022-02-18 VITALS
DIASTOLIC BLOOD PRESSURE: 72 MMHG | HEIGHT: 72 IN | WEIGHT: 201.5 LBS | TEMPERATURE: 98.2 F | BODY MASS INDEX: 27.29 KG/M2 | SYSTOLIC BLOOD PRESSURE: 126 MMHG

## 2022-02-18 DIAGNOSIS — Z48.89 AFTERCARE FOLLOWING SURGERY FOR INJURY AND TRAUMA: Primary | ICD-10-CM

## 2022-02-18 PROCEDURE — 99024 POSTOP FOLLOW-UP VISIT: CPT

## 2022-02-18 NOTE — PROGRESS NOTES
Post-Op Visit- Neurosurgery    Brad oMnge 64 y o  male MRN: [id number]    Chief Complaint:   Patient presents post: Metrx L4-5 left hemilaminectomy and bilateral foraminotomy - Left    History of Present Illness:  Patient presents for 2 week POV for incision check alone and ambulating without an assistive device  Patient reports he is doing well overall and denies any incisional issues or fevers  He denies any new weakness, numbness or tingling since the surgery and denies any new issues with his bowel or bladder  He reports his pain has resolved since the surgery and he is very pleased with the results from the surgery        Current Outpatient Medications:     acetaminophen (TYLENOL) 325 mg tablet, Take 3 tablets (975 mg total) by mouth every 8 (eight) hours, Disp: , Rfl: 0    b complex vitamins capsule, Take 1 capsule by mouth daily  , Disp: , Rfl:     Cyanocobalamin (VITAMIN B-12 PO), Take by mouth in the morning, Disp: , Rfl:     Diclofenac Sodium (VOLTAREN) 1 %, Apply 2 g topically 4 (four) times a day, Disp: 4 g, Rfl: 2    ezetimibe (ZETIA) 10 mg tablet, Take 10 mg by mouth daily, Disp: , Rfl:     hydrochlorothiazide (HYDRODIURIL) 25 mg tablet, Take by mouth daily  , Disp: , Rfl:     lisinopril (ZESTRIL) 10 mg tablet, Take by mouth  , Disp: , Rfl:     NIACIN PO, Take by mouth in the morning, Disp: , Rfl:     NON FORMULARY, in the morning Super Beta Prostate, Disp: , Rfl:     Specialty Vitamins Products (PROSTATE PO), Take by mouth in the morning, Disp: , Rfl:     tadalafil (CIALIS) 20 MG tablet, Take 20 mg by mouth as needed  , Disp: , Rfl:     TURMERIC PO, Take by mouth in the morning, Disp: , Rfl:     Vitamin D-Vitamin K (Vitamin K2-Vitamin D3)  MCG-UNIT CAPS, Take by mouth in the morning, Disp: , Rfl:     Empagliflozin 10 MG TABS, 10 mg in the morning  , Disp: , Rfl:     gabapentin (NEURONTIN) 300 mg capsule, Take 1 capsule (300 mg total) by mouth 3 (three) times a day (Patient not taking: Reported on 2/18/2022 ), Disp: 90 capsule, Rfl: 0    lisinopril (ZESTRIL) 40 mg tablet, Take 40 mg by mouth daily  , Disp: , Rfl:     metFORMIN (GLUCOPHAGE) 1000 MG tablet, Take 1,000 mg by mouth every evening With snack , Disp: , Rfl:     methocarbamol (ROBAXIN) 500 mg tablet, Take 1 tablet (500 mg total) by mouth 4 (four) times a day for 7 days, Disp: 28 tablet, Rfl: 0    senna-docusate sodium (SENOKOT S) 8 6-50 mg per tablet, Take 1 tablet by mouth 2 (two) times a day for 7 days, Disp: 14 tablet, Rfl: 0     Allergies   Allergen Reactions    Simvastatin Other (See Comments)     Muscle weakness/joint pain        Assessment:    Vitals:    02/18/22 1052   BP: 126/72   Temp: 98 2 °F (36 8 °C)   Weight: 91 4 kg (201 lb 8 oz)   Height: 6' (1 829 m)        Wound Exam: Incision well approximated  No erythema, edema or drainage present  Location: lumbar  Complications: None  Incision EDYTA  Discussion/Summary:  Reviewed incision care with patient including daily observation for s/s infection including: increased erythema, edema, drainage, dehiscence of incision or fever >101  Should these be observed, he understands that he is to call and/or return immediately for reassessment  Advised patient to continue cleansing area with mild soap and water and pat dry  Not to apply any lotions, creams, or ointments, & not to submerge in any water for two more weeks  He is to maintain activity restrictions until cleared by the surgeon  Activity levels were also reviewed with the patient in detail, he is to slowly increase his level of activity and ambulation is encouraged as tolerated  Verified date/time/location of upcoming POV and reminded him to call the office with any further questions or concerns, or if any incisional issues or fevers would arise

## 2022-02-28 ENCOUNTER — OFFICE VISIT (OUTPATIENT)
Dept: PAIN MEDICINE | Facility: CLINIC | Age: 77
End: 2022-02-28
Payer: MEDICARE

## 2022-02-28 VITALS
HEIGHT: 72 IN | DIASTOLIC BLOOD PRESSURE: 60 MMHG | SYSTOLIC BLOOD PRESSURE: 140 MMHG | TEMPERATURE: 97.4 F | RESPIRATION RATE: 20 BRPM | WEIGHT: 202 LBS | BODY MASS INDEX: 27.36 KG/M2

## 2022-02-28 DIAGNOSIS — M17.0 PRIMARY OSTEOARTHRITIS OF BOTH KNEES: Primary | ICD-10-CM

## 2022-02-28 DIAGNOSIS — M54.16 LUMBAR RADICULOPATHY: ICD-10-CM

## 2022-02-28 PROCEDURE — 99214 OFFICE O/P EST MOD 30 MIN: CPT | Performed by: ANESTHESIOLOGY

## 2022-02-28 RX ORDER — LISINOPRIL 20 MG/1
20 TABLET ORAL DAILY
COMMUNITY
Start: 2022-02-04

## 2022-02-28 NOTE — PROGRESS NOTES
Assessment  1  Primary osteoarthritis of both knees - Bilateral  -     Ambulatory referral to Orthopedic Surgery; Future    2  Lumbar radiculopathy - Bilateral    Greater than 90% relief of pain with improved ability to participate with IADLs after right genicular nerve blocks (3) for approximately 2 days and with subsequent radiofrequency ablation; however this pain relief was not lasting  Describes more achy, nagging, indolent, crampy throbbing stabbing pain with weight bearing in right knee where xray reveals moderate degenerative changes in the medial and anterior portions of right knee  Describes similar characteristics in left knee, to lesser degree  Patient has underwent decompressive intervention via L4-5 left hemilaminectomy and bilateral foraminotomy  Chronic and progressive lumbar radicular pain in L5 and S1 dermatomal distributions accompanied by weakness numbness and paresthesias  Multilevel lumbar degenerative disc disease most pronounced at L4-5 where there is a central and right paramedian disc herniation/extrusion resulting in moderate canal stenosis  Symptoms consistent with lumbar spinal stenosis with neurogenic claudication  Facet joint injections did not provide significant relief in the past  Pain relieved by leaning forward, pausing for a few moments before resuming ambulation  +SLR bilaterally, otherwise 5/5 strength in all extremities with AROM  Will obtain imaging to guide interventional therapy  Risks, benefits and alternatives to medications and BUCK discussed with patient  Plan  -f/u with NSGY; advised PT once cleared to do so  -referral to orthopedics for synvisc injections of knees bilaterally   -may return to our clinic prn  -gabapentin 300 mg t i d  Ordered for patient; has tapered  counseled regarding sedative effects of taking this medication and provided up titration calendar    Counseled not to take medication while driving or operating heavy machinery/using stairs  -meloxicam 7 5 mg b i d  prn tapered and replaced with voltaren gel to be applied to right knee  Patient educated regarding bleeding risk of taking this medication not taking any other nonsteroidal anti-inflammatory medications while taking this medication; counseled thoroughly regarding potential risk of Cardiovascular injury, Kidney injury, Gastrointestinal ulceration/bleeding  Patient voiced understanding  -physical therapy for lumbar radiculopathy, right knee pain, left knee pain; Core and knee exercises additionally provided for physician directed home PT that the patient plans to participate with for 1 hour, twice a week for the next 6 weeks  There are risks associated with opioid medications, including dependence, addiction and tolerance  The patient understands and agrees to use these medications only as prescribed  Potential side effects of the medications include, but are not limited to, constipation, drowsiness, addiction, impaired judgment and risk of fatal overdose if not taken as prescribed  The patient was warned against driving while taking sedation medications  Sharing medications is a felony  At this point in time, the patient is showing no signs of addiction, abuse, diversion or suicidal ideation  [place] Prescription Drug Monitoring Program report was reviewed and was appropriate     Complete risks and benefits including bleeding, infection, tissue reaction, nerve injury and allergic reaction were discussed  The approach was demonstrated using models and literature was provided  Verbal and written consent was obtained  My impressions and treatment recommendations were discussed in detail with the patient who verbalized understanding and had no further questions  Discharge instructions were provided  I personally saw and examined the patient and I agree with the above discussed plan of care      New Medications Ordered This Visit   Medications    TADALAFIL PO     Si mg    lisinopril (ZESTRIL) 20 mg tablet       History of Present Illness    Greater than 90% relief of pain with improved ability to participate with IADLs after right genicular nerve blocks (3) for approximately 2 days and with subsequent radiofrequency ablation; however this pain relief was not lasting  Describes more achy, nagging, indolent, crampy throbbing stabbing pain with weight bearing in right knee where xray reveals moderate degenerative changes in the medial and anterior portions of right knee  Describes similar characteristics in left knee, to lesser degree  Patient has underwent decompressive intervention via L4-5 left hemilaminectomy and bilateral foraminotomy  Maddi Dan is a 64 y o  male  With pmhx of xol, htn, dm-2, smoking presenting with a long history of chronic lumbar radicular pain in an L5 and S1 dermatomal distributions  Debilitating weakness numbness and paresthesias accompany the pain  The patient rates the pain at a 8/10 accompanied by electric shock-like shooting features and crampy burning pain in the aforementioned dermatomal distributions  The pain is worse in the mornings as well as the end of the day; exertion such as walking for long periods of time seems to exacerbate the pain  The patient can hardly walk more than a few blocks without having debilitating pain  He tries to maintain an active lifestyle and finds that the current degree of pain seems to compromises his efforts  The pain significantly impacts independent activities of daily living and contributes to significant disability  He has attempted physical therapy without significant benefit in the past   He has taken celebrex with limited relief of the pain as well  He has never tried epidural steroid injections in the past but has previously trialed facet joint injections without significant benefit  He denies any bowel or bladder dysfunction/incontinence      I have personally reviewed and/or updated the patient's past medical history, past surgical history, family history, social history, current medications, allergies, and vital signs today  Review of Systems   Constitutional: Positive for activity change  HENT: Negative  Eyes: Negative  Respiratory: Negative  Cardiovascular: Negative  Gastrointestinal: Negative  Endocrine: Negative  Genitourinary: Negative  Musculoskeletal: Positive for arthralgias, back pain, gait problem and myalgias  Skin: Negative  Allergic/Immunologic: Negative  Neurological: Positive for weakness and numbness  Hematological: Negative  Psychiatric/Behavioral: Negative  All other systems reviewed and are negative        Patient Active Problem List   Diagnosis    Lumbar radiculopathy - Bilateral    Spinal stenosis of lumbar region with neurogenic claudication    Primary osteoarthritis of right knee - Right    Stage 3 chronic kidney disease, unspecified whether stage 3a or 3b CKD (HCC)       Past Medical History:   Diagnosis Date    Arthritis     AV block, 1st degree     BPH (benign prostatic hyperplasia)     Chronic kidney disease     Stage 3    Colon polyp     [facility]ch[facility] [address] [facility]     Diabetes mellitus ([facility] [address] )     takes metformin    Hyperlipidemia     Hypertension     Kidney stone     Spinal stenosis        Past Surgical History:   Procedure Laterality Date    CATARACT EXTRACTION      CATARACT EXTRACTION, BILATERAL      COLONOSCOPY      COLONOSCOPY      EPIDURAL BLOCK INJECTION N/A 10/19/2021    Procedure: L5 S1 INTERLAMINAR EPIDURAL STEROID INJECTION;  Surgeon: Papa Diaz MD;  Location:  [facility];  Service: Pain Management     EYE SURGERY      KIDNEY STONE SURGERY      MS LAMINEC/FACETECT/FORAMIN,LUMBAR 1 SEG Left 2/4/2022    Procedure: Metrx L4-5 left hemilaminectomy and bilateral foraminotomy;  Surgeon: Pamela Ramos MD;  [facility];  Service: Neurosurgery    PROSTATE SURGERY      RADIOFREQUENCY ABLATION Right 1/4/2022    Procedure: right genicular nerve radiofrequency ablation (3);  Surgeon: Solo Grant MD;  Location: [facility];  Service: Pain Management     TONSILLECTOMY AND ADENOIDECTOMY      TRANSURETHRAL RESECTION OF PROSTATE         Family History   Problem Relation Age of Onset    Diabetes Mother     No Known Problems Father        Social History     Occupational History    Not on file   Tobacco Use    Smoking status: Current Every Day Smoker     Packs/day: 0 50     Years: 15 00     Pack years: 7 50     Types: Cigarettes    Smokeless tobacco: Never Used   Vaping Use    Vaping Use: Never used   Substance and Sexual Activity    Alcohol use: Not Currently    Drug use: Never    Sexual activity: Yes       Current Outpatient Medications on File Prior to Visit   Medication Sig    acetaminophen (TYLENOL) 325 mg tablet Take 3 tablets (975 mg total) by mouth every 8 (eight) hours    b complex vitamins capsule Take 1 capsule by mouth daily      Cyanocobalamin (VITAMIN B-12 PO) Take by mouth in the morning    ezetimibe (ZETIA) 10 mg tablet Take 10 mg by mouth daily    hydrochlorothiazide (HYDRODIURIL) 25 mg tablet Take by mouth daily      lisinopril (ZESTRIL) 20 mg tablet     NIACIN PO Take by mouth in the morning    Specialty Vitamins Products (PROSTATE PO) Take by mouth in the morning    tadalafil (CIALIS) 20 MG tablet Take 20 mg by mouth as needed      TADALAFIL PO 20 mg    TURMERIC PO Take by mouth in the morning    Vitamin D-Vitamin K (Vitamin K2-Vitamin D3)  MCG-UNIT CAPS Take by mouth in the morning    [DISCONTINUED] lisinopril (ZESTRIL) 10 mg tablet Take by mouth      Empagliflozin 10 MG TABS 10 mg in the morning      metFORMIN (GLUCOPHAGE) 1000 MG tablet Take 1,000 mg by mouth every evening With snack     methocarbamol (ROBAXIN) 500 mg tablet Take 1 tablet (500 mg total) by mouth 4 (four) times a day for 7 days    senna-docusate sodium (SENOKOT S) 8 6-50 mg per tablet Take 1 tablet by mouth 2 (two) times a day for 7 days    [DISCONTINUED] Diclofenac Sodium (VOLTAREN) 1 % Apply 2 g topically 4 (four) times a day    [DISCONTINUED] gabapentin (NEURONTIN) 300 mg capsule Take 1 capsule (300 mg total) by mouth 3 (three) times a day (Patient not taking: Reported on 2/18/2022 )    [DISCONTINUED] lisinopril (ZESTRIL) 40 mg tablet Take 40 mg by mouth daily      [DISCONTINUED] NON FORMULARY in the morning Super Beta Prostate     No current facility-administered medications on file prior to visit  Allergies   Allergen Reactions    Simvastatin Other (See Comments)     Muscle weakness/joint pain         Physical Exam    /60   Temp (!) 97 4 °F (36 3 °C)   Resp 20   Ht 6' (1 829 m)   Wt 91 6 kg (202 lb)   BMI 27 40 kg/m²     Constitutional: normal, well developed, well nourished, alert, in no distress and non-toxic and no overt pain behavior  and overweight  Eyes: anicteric  HEENT: grossly intact  Neck: supple, symmetric, trachea midline and no masses   Pulmonary:even and unlabored  Cardiovascular:No edema or pitting edema present  Skin:Normal without rashes or lesions and well hydrated  Psychiatric:Mood and affect appropriate  Neurologic:Cranial Nerves II-XII grossly intact Sensation grossly intact; no clonus negative hunt's  Reflexes 2+ and brisk  SLR positive bilaterally  Musculoskeletal:normal gait  5/5 strength in all extremities with AROM bilaterally  Normal heel toe and tip toe walking  pain with lumbar facet loading bilaterally and with lateral spine rotation  ttp over lumbar paraspinal muscles  Negative gerry's test, negative gaenslen's negative SIJ loading bilaterally  ttp over medial and lateral joint line of knees bilaterally; no effusion dolor; crepitus appreciable  restricted knee extension right knee, no baker's cyst palpable       Imaging    Outside xrays reviewed of knee and pelvis showing moderate degenerative changes  MRI LUMBAR SPINE WITHOUT CONTRAST     INDICATION: [id number]: Radiculopathy, lumbar region      COMPARISON:  None      TECHNIQUE:  Sagittal T1, sagittal T2, sagittal inversion recovery, axial T1 and axial T2, coronal T2     IMAGE QUALITY:  Diagnostic     FINDINGS:     VERTEBRAL BODIES:  There are 5 lumbar type vertebral bodies  Normal alignment of the lumbar spine  No spondylolysis or spondylolisthesis  No scoliosis  No compression fracture  Multiple hemangiomas are seen within the lower thoracic and lumbar   vertebral bodies      SACRUM:  Normal signal within the sacrum  No evidence of insufficiency or stress fracture      DISTAL CORD AND CONUS:  Normal size and signal within the distal cord and conus      PARASPINAL SOFT TISSUES:  There is an exophytic cyst arising from the lower pole the right kidney incompletely imaged on this examination  This measures at least 8 5 cm in craniocaudad dimension      LOWER THORACIC DISC SPACES:  Normal disc height and signal   No disc herniation, canal stenosis or foraminal narrowing      LUMBAR DISC SPACES:     L1-L2:  Mild annular bulging with anterior osteophyte formation  No disc herniation  No canal stenosis or foraminal nerve impingement      L2-L3:  Mild annular bulging  Mild endplate degenerative change  Trace facet effusions  Mild canal stenosis and bilateral foraminal narrowing      L3-L4:  Mild diffuse annular bulging  Mild canal stenosis and bilateral foraminal narrowing      L4-L5:  There is loss of disc height with annular bulging and a small central disc protrusion  Slight superior and inferior extrusion to the right of midline  Mild right greater than left facet degenerative change  Moderate central canal stenosis with   distortion of the thecal sac  Mild bilateral foraminal narrowing      L5-S1:  Minor annular bulging and facet degenerative change    No disc herniation, canal stenosis or foraminal nerve impingement      IMPRESSION:     Multilevel lumbar degenerative disc disease most pronounced at L4-5 where there is a central and right paramedian disc herniation/extrusion resulting in moderate canal stenosis

## 2022-02-28 NOTE — PATIENT INSTRUCTIONS
Knee Exercises   AMBULATORY CARE:   What you need to know about knee exercises:  Knee exercises help strengthen the muscles around your knee  Strong muscles can help reduce pain and decrease your risk of future injury  Knee exercises also help you heal after an injury or surgery  · Start slow  These are beginning exercises  Ask your healthcare provider if you need to see a physical therapist for more advanced exercises  As you get stronger, you may be able to do more sets of each exercise or add weights  · Stop if you feel pain  It is normal to feel some discomfort at first  Regular exercise will help decrease your discomfort over time  · Do the exercises on both legs  Do this so both knees remain strong  · Warm up before you do knee exercises  Walk or ride a stationary bike for 5 or 10 minutes to warm your muscles  How to perform knee stretches safely:  Always stretch before you do strengthening exercises  Do these stretching exercises again after you do the strengthening exercises  Do these stretches 4 or 5 days a week, or as directed  · Standing calf stretch: Face a wall and place both palms flat on the wall, or hold the back of a chair for balance  Keep a slight bend in your knees  Take a big step backward with one leg  Keep your other leg directly under you  Keep both heels flat and press your hips forward  Hold the stretch for 30 seconds, and then relax for 30 seconds  Switch legs  Repeat 2 or 3 times on each leg  · Standing quadriceps stretch:  Stand and place one hand against a wall or hold the back of a chair for balance  With your weight on one leg, bend your other leg and grab your ankle  Bring your heel toward your buttocks  Hold the stretch for 30 to 60 seconds  Switch legs  Repeat 2 or 3 times on each leg  · Sitting hamstring stretch:  Sit with both legs straight in front of you  Do not point or flex your toes   Place your palms on the floor and slide your hands forward until you feel the stretch  Do not round your back  Hold the stretch for 30 seconds  Repeat 2 or 3 times  How to perform knee strengthening exercises safely:  Do these exercises 4 or 5 days a week, or as directed  · Standing half squats:  Stand with your feet shoulder-width apart  Lean your back against a wall or hold the back of a chair for balance, if needed  Slowly sit down about 10 inches, as if you are going to sit in a chair  Your body weight should be mostly over your heels  Hold the squat for 5 seconds, then rise to a standing position  Do 3 sets of 10 squats to strengthen your buttocks and thighs  · Standing hamstring curls: Face a wall and place both palms flat on the wall, or hold the back of a chair for balance  With your weight on one leg, lift your other foot as close to your buttocks as you can  Hold for 5 seconds and then lower your leg  Do 2 sets of 10 curls on each leg  This exercise strengthens the muscles in the back of your thigh  · Standing calf raises:  Face a wall and place both palms flat on the wall, or hold the back of a chair for balance  Stand up straight, and do not lean  Place all your weight on one leg by lifting the other foot off the floor  Raise the heel of the foot that is on the floor as high as you can and then lower it  Do 2 sets of 10 calf raises on each leg to strengthen your calf muscles  · Straight leg lifts:  Lie on your stomach with straight legs  Fold your arms in front of you and rest your head in your arms  Tighten your leg muscles and raise one leg as high as you can  Hold for 5 seconds, then lower your leg  Do 2 sets of 10 lifts on each leg to strengthen your buttocks  · Sitting leg lifts:  Sit in a chair  Slowly straighten and raise one leg  Squeeze your thigh muscles and hold for 5 seconds  Relax and return your foot to the floor  Do 2 sets of 10 lifts on each leg   This helps strengthen the muscles in the front of your thigh  Contact your healthcare provider if:   · You have new pain or your pain becomes worse  · You have questions or concerns about your condition or care  © Copyright [employer] 2022 Information is for End User's use only and may not be sold, redistributed or otherwise used for commercial purposes  All illustrations and images included in CareNotes® are the copyrighted property of [employer]  or [facility] Geoff Honeycutt   The above information is an  only  It is not intended as medical advice for individual conditions or treatments  Talk to your doctor, nurse or pharmacist before following any medical regimen to see if it is safe and effective for you

## 2022-03-04 ENCOUNTER — OFFICE VISIT (OUTPATIENT)
Dept: OBGYN CLINIC | Facility: CLINIC | Age: 77
End: 2022-03-04
Payer: MEDICARE

## 2022-03-04 VITALS
DIASTOLIC BLOOD PRESSURE: 80 MMHG | TEMPERATURE: 97.6 F | HEIGHT: 72 IN | WEIGHT: 203.2 LBS | SYSTOLIC BLOOD PRESSURE: 150 MMHG | HEART RATE: 73 BPM | BODY MASS INDEX: 27.52 KG/M2

## 2022-03-04 DIAGNOSIS — G89.29 CHRONIC PAIN OF RIGHT KNEE: Primary | ICD-10-CM

## 2022-03-04 DIAGNOSIS — M17.0 PRIMARY OSTEOARTHRITIS OF BOTH KNEES: ICD-10-CM

## 2022-03-04 DIAGNOSIS — M25.561 CHRONIC PAIN OF RIGHT KNEE: Primary | ICD-10-CM

## 2022-03-04 PROBLEM — M17.11 PRIMARY OSTEOARTHRITIS OF RIGHT KNEE: Status: RESOLVED | Noted: 2021-12-29 | Resolved: 2022-03-04

## 2022-03-04 PROCEDURE — 99204 OFFICE O/P NEW MOD 45 MIN: CPT | Performed by: ORTHOPAEDIC SURGERY

## 2022-03-04 NOTE — PROGRESS NOTES
ASSESSMENT/PLAN:    Diagnoses and all orders for this visit:    Chronic pain of right knee  -     Injection procedure prior authorization; Future    Primary osteoarthritis of both knees - Bilateral  -     Ambulatory referral to Orthopedic Surgery  -     Injection procedure prior authorization; Future        Plan:  Treatment options were discussed  He would like to try the Visco supplement injection prior to consideration of surgery and a request for authorization was placed  I will see him once this has been approved  He is to contact me if questions or concerns arise in the interim  Return for After Visco supplement available       _____________________________________________________  CHIEF COMPLAINT:  Chief Complaint   Patient presents with    Right Knee - Pain    Left Knee - Pain         SUBJECTIVE:  Marv Nagy is a 64y o  year old male who presents for evaluation of his right knee  He notes a minimal degree of left knee but is very much un concerned about his left knee  However, his right knee is a daily problem for him  He admits to symptoms for about 5 years  He was seen by Dr Faith Garsia and underwent intra-articular injection as well as genicular nerve ablation  He denies any significant improvement with either procedure  He notes start-up pain  He denies pain at rest   He notes some swelling  He has tried anti-inflammatory medications as prescribed by Dr Faith Garsia without benefit  He has been referred for orthopedic surgical consultation and treatment regarding his right knee symptoms        PAST MEDICAL HISTORY:  Past Medical History:   Diagnosis Date    Arthritis     AV block, 1st degree     BPH (benign prostatic hyperplasia)     Chronic kidney disease     Stage 3    Colon polyp     [facility]     Diabetes mellitus ([facility] )     takes metformin    Hyperlipidemia     Hypertension     Kidney stone     Spinal stenosis        PAST SURGICAL HISTORY:  Past Surgical History:   Procedure Laterality Date    CATARACT EXTRACTION      CATARACT EXTRACTION, BILATERAL      COLONOSCOPY      COLONOSCOPY      EPIDURAL BLOCK INJECTION N/A 10/19/2021    Procedure: L5 S1 INTERLAMINAR EPIDURAL STEROID INJECTION;  Surgeon: Idla Huerta MD;  [facility];  Service: Pain Management     EYE SURGERY      KIDNEY STONE SURGERY      TX LAMINEC/FACETECT/FORAMIN,LUMBAR 1 SEG Left 2/4/2022    Procedure: Metrx L4-5 left hemilaminectomy and bilateral foraminotomy;  Surgeon: Geronimo Wilson MD;  [facility];  Service: Neurosurgery    PROSTATE SURGERY      RADIOFREQUENCY ABLATION Right 1/4/2022    Procedure: right genicular nerve radiofrequency ablation (3);  Surgeon: Ilda Huerta MD;  [facility];  Service: Pain Management     TONSILLECTOMY AND ADENOIDECTOMY      TRANSURETHRAL RESECTION OF PROSTATE         FAMILY HISTORY:  Family History   Problem Relation Age of Onset    Diabetes Mother     No Known Problems Father        SOCIAL HISTORY:  Social History     Tobacco Use    Smoking status: Current Every Day Smoker     Packs/day: 0 50     Years: 35 00     Pack years: 17 50     Types: Cigarettes    Smokeless tobacco: Never Used   Vaping Use    Vaping Use: Never used   Substance Use Topics    Alcohol use: Not Currently    Drug use: Never       MEDICATIONS:    Current Outpatient Medications:     acetaminophen (TYLENOL) 325 mg tablet, Take 3 tablets (975 mg total) by mouth every 8 (eight) hours, Disp: , Rfl: 0    b complex vitamins capsule, Take 1 capsule by mouth daily  , Disp: , Rfl:     Cyanocobalamin (VITAMIN B-12 PO), Take by mouth in the morning, Disp: , Rfl:     Empagliflozin 10 MG TABS, 10 mg in the morning  , Disp: , Rfl:     ezetimibe (ZETIA) 10 mg tablet, Take 10 mg by mouth daily, Disp: , Rfl:     hydrochlorothiazide (HYDRODIURIL) 25 mg tablet, Take by mouth daily  , Disp: , Rfl:     lisinopril (ZESTRIL) 20 mg tablet, , Disp: , Rfl:     metFORMIN (GLUCOPHAGE) 1000 MG tablet, Take 1,000 mg by mouth every evening With snack , Disp: , Rfl:     NIACIN PO, Take by mouth in the morning, Disp: , Rfl:     Specialty Vitamins Products (PROSTATE PO), Take by mouth in the morning, Disp: , Rfl:     tadalafil (CIALIS) 20 MG tablet, Take 20 mg by mouth as needed  , Disp: , Rfl:     TADALAFIL PO, 20 mg, Disp: , Rfl:     TURMERIC PO, Take by mouth in the morning, Disp: , Rfl:     Vitamin D-Vitamin K (Vitamin K2-Vitamin D3)  MCG-UNIT CAPS, Take by mouth in the morning, Disp: , Rfl:     methocarbamol (ROBAXIN) 500 mg tablet, Take 1 tablet (500 mg total) by mouth 4 (four) times a day for 7 days (Patient not taking: Reported on 3/4/2022 ), Disp: 28 tablet, Rfl: 0    senna-docusate sodium (SENOKOT S) 8 6-50 mg per tablet, Take 1 tablet by mouth 2 (two) times a day for 7 days (Patient not taking: Reported on 3/4/2022 ), Disp: 14 tablet, Rfl: 0    ALLERGIES:  Allergies   Allergen Reactions    Simvastatin Other (See Comments)     Muscle weakness/joint pain       Review of systems:   Constitutional: Negative for fatigue, fever or loss of apetite  HENT: Negative  Respiratory: Negative for shortness of breath, dyspnea  Cardiovascular: Negative for chest pain/tightness  Gastrointestinal: Negative for abdominal pain, N/V  Endocrine: Negative for cold/heat intolerance, unexplained weight loss/gain  Genitourinary: Negative for flank pain, dysuria, hematuria  Musculoskeletal:  Positive as in the HPI   Skin: Negative for rash  Neurological:  Negative  Psychiatric/Behavioral: Negative for agitation  _____________________________________________________  PHYSICAL EXAMINATION:    Blood pressure 150/80, pulse 73, temperature 97 6 °F (36 4 °C), temperature source Temporal, height 6' (1 829 m), weight 92 2 kg (203 lb 3 2 oz)      General: well developed and well nourished, alert, oriented times 3 and appears comfortable  Psychiatric: Normal  HEENT: Benign  Cardiovascular: Regular    Pulmonary: No wheezing or stridor  Abdomen: Soft, Nontender  Skin: No masses, erythema, lacerations, fluctation, ulcerations  Neurovascular: Motor and sensory exams are intact and pulses palpable in both lower extremities  MUSCULOSKELETAL EXAMINATION:    The right knee exam demonstrates skin to be warm and dry  He has varus deformity and hypertrophy  He has a 5 degree flexion contracture and can flex to only 100°  He complains of tightness with end range of motion but no significant pain  A moderate effusion is noted  There is no erythema or rubor  He has significant tenderness to palpation of the medial knee  The varus deformity can be corrected toward neutral without complaints  Varus stress elicits no complaints  There is mild crepitus noted during passive range of motion  The remainder of the lower extremity examination bilaterally is benign  _____________________________________________________  STUDIES REVIEWED:  X-rays from an outside institution available and dated 05/07/2021 demonstrate complete loss of medial joint space with subchondral sclerosis, irregularity of the femoral condyle and cyst formation  Osteophytes are noted  There is lesser involvement at the patellofemoral joint but there is narrowing of the patellofemoral joint, subchondral sclerosis and osteophytes  There is minimal lateral compartment involvement        Kaia Duarte

## 2022-03-11 ENCOUNTER — TELEPHONE (OUTPATIENT)
Dept: OBGYN CLINIC | Facility: HOSPITAL | Age: 77
End: 2022-03-11

## 2022-03-14 ENCOUNTER — PROCEDURE VISIT (OUTPATIENT)
Dept: OBGYN CLINIC | Facility: CLINIC | Age: 77
End: 2022-03-14
Payer: MEDICARE

## 2022-03-14 VITALS
SYSTOLIC BLOOD PRESSURE: 140 MMHG | WEIGHT: 203 LBS | TEMPERATURE: 97.7 F | BODY MASS INDEX: 27.5 KG/M2 | HEART RATE: 72 BPM | HEIGHT: 72 IN | DIASTOLIC BLOOD PRESSURE: 80 MMHG

## 2022-03-14 DIAGNOSIS — M25.561 CHRONIC PAIN OF RIGHT KNEE: ICD-10-CM

## 2022-03-14 DIAGNOSIS — G89.29 CHRONIC PAIN OF RIGHT KNEE: ICD-10-CM

## 2022-03-14 DIAGNOSIS — M17.11 PRIMARY OSTEOARTHRITIS OF RIGHT KNEE: Primary | ICD-10-CM

## 2022-03-14 PROCEDURE — 20610 DRAIN/INJ JOINT/BURSA W/O US: CPT | Performed by: ORTHOPAEDIC SURGERY

## 2022-03-14 PROCEDURE — 99213 OFFICE O/P EST LOW 20 MIN: CPT | Performed by: ORTHOPAEDIC SURGERY

## 2022-03-14 NOTE — PROGRESS NOTES
ASSESSMENT/PLAN:    Problem List Items Addressed This Visit     None      Visit Diagnoses     Primary osteoarthritis of right knee    -  Primary    Relevant Orders    Large joint arthrocentesis: R knee    Chronic pain of right knee              Using aseptic technique the patient's right knee was injected with Durolane  The patient tolerated surgery well described below  He was instructed to ice the knees for 20 minutes at a time for pain swelling  The patient was advised that the Visco injection may take up to 2 weeks to see full effect  He will return to the office in 2-3 weeks for recheck  Patient was instructed call or return to the office sooner if any problems arise  Return for recheck in 2-3 weeks  _____________________________________________________  CHIEF COMPLAINT:  Chief Complaint   Patient presents with    Follow-up         SUBJECTIVE:  Sue Yu is a 64 y o  male who presents for follow up of his right knee osteoarthritis  The patient is here today for his Durolane visco injection  The patient denies any recent injury or trauma  He denies any numbness or tingling in the lower extremity  He has no other questions or concerns today       PAST MEDICAL HISTORY:  Past Medical History:   Diagnosis Date    Arthritis     AV block, 1st degree     BPH (benign prostatic hyperplasia)     Chronic kidney disease     Stage 3    Colon polyp     [facility]     Diabetes mellitus [facility]     takes metformin    Hyperlipidemia     Hypertension     Kidney stone     Spinal stenosis        PAST SURGICAL HISTORY:  Past Surgical History:   Procedure Laterality Date    CATARACT EXTRACTION      CATARACT EXTRACTION, BILATERAL      COLONOSCOPY      COLONOSCOPY      EPIDURAL BLOCK INJECTION N/A 10/19/2021    Procedure: L5 S1 INTERLAMINAR EPIDURAL STEROID INJECTION;  Surgeon: Griselda Anguiano MD;  Location: [facility];  Service: Pain Management     EYE SURGERY  KIDNEY STONE SURGERY      VA LAMINEC/FACETECT/FORAMIN,LUMBAR 1 SEG Left 2/4/2022    Procedure: Metrx L4-5 left hemilaminectomy and bilateral foraminotomy;  Surgeon: Casa Conklin MD;  [facility];  Service: Neurosurgery    PROSTATE SURGERY      RADIOFREQUENCY ABLATION Right 1/4/2022    Procedure: right genicular nerve radiofrequency ablation (3);  Surgeon: Jimmy Duenas MD;  [facility];  Service: Pain Management     TONSILLECTOMY AND ADENOIDECTOMY      TRANSURETHRAL RESECTION OF PROSTATE         FAMILY HISTORY:  Family History   Problem Relation Age of Onset    Diabetes Mother     No Known Problems Father        SOCIAL HISTORY:  Social History     Tobacco Use    Smoking status: Current Every Day Smoker     Packs/day: 0 50     Years: 35 00     Pack years: 17 50     Types: Cigarettes    Smokeless tobacco: Never Used   Vaping Use    Vaping Use: Never used   Substance Use Topics    Alcohol use: Not Currently    Drug use: Never       MEDICATIONS:    Current Outpatient Medications:     acetaminophen (TYLENOL) 325 mg tablet, Take 3 tablets (975 mg total) by mouth every 8 (eight) hours, Disp: , Rfl: 0    b complex vitamins capsule, Take 1 capsule by mouth daily  , Disp: , Rfl:     Cyanocobalamin (VITAMIN B-12 PO), Take by mouth in the morning, Disp: , Rfl:     Empagliflozin 10 MG TABS, 10 mg in the morning  , Disp: , Rfl:     ezetimibe (ZETIA) 10 mg tablet, Take 10 mg by mouth daily, Disp: , Rfl:     hydrochlorothiazide (HYDRODIURIL) 25 mg tablet, Take by mouth daily  , Disp: , Rfl:     lisinopril (ZESTRIL) 20 mg tablet, Take 20 mg by mouth daily  , Disp: , Rfl:     metFORMIN (GLUCOPHAGE) 1000 MG tablet, Take 1,000 mg by mouth every evening With snack , Disp: , Rfl:     NIACIN PO, Take by mouth in the morning, Disp: , Rfl:     Specialty Vitamins Products (PROSTATE PO), Take by mouth in the morning, Disp: , Rfl:     tadalafil (CIALIS) 20 MG tablet, Take 20 mg by mouth as needed  , Disp: , Rfl:     TADALAFIL PO, 20 mg, Disp: , Rfl:     TURMERIC PO, Take by mouth in the morning, Disp: , Rfl:     Vitamin D-Vitamin K (Vitamin K2-Vitamin D3)  MCG-UNIT CAPS, Take by mouth in the morning, Disp: , Rfl:     methocarbamol (ROBAXIN) 500 mg tablet, Take 1 tablet (500 mg total) by mouth 4 (four) times a day for 7 days (Patient not taking: Reported on 3/4/2022 ), Disp: 28 tablet, Rfl: 0    senna-docusate sodium (SENOKOT S) 8 6-50 mg per tablet, Take 1 tablet by mouth 2 (two) times a day for 7 days (Patient not taking: Reported on 3/4/2022 ), Disp: 14 tablet, Rfl: 0    ALLERGIES:  Allergies   Allergen Reactions    Simvastatin Other (See Comments)     Muscle weakness/joint pain       REVIEW OF SYSTEMS:  Pertinent items are noted in HPI    A comprehensive review of systems was negative       _____________________________________________________  PHYSICAL EXAMINATION:  General: well developed and well nourished, alert, oriented times 3 and appears comfortable  Psychiatric: Normal  HEENT:  Normocephalic, atraumatic  Cardiovascular:  Regular  Pulmonary: No wheezing or stridor  Skin: No masses, erthema, lacerations, fluctation, ulcerations  Neurovascular: strong distal pulses, sensory and motor testing intact     MUSCULOSKELETAL EXAMINATION:    Right knee:  - The patient is able to ambulate well on his own without abnormal gait  -there is a visible varus deformity  - skin without lesions, effusion, ecchymosis, erythema, warmth, swelling, or other signs of infection  -there is palpable tenderness along the medial joint line space  -active knee range of motion 5-110 degrees with crepitation  -knee stable to varus valgus stress  -soft lower extremity compartments  -sensation intact to light touch  -lower extremity well perfused    _____________________________________________________  STUDIES REVIEWED:  No new imaging performed today    PROCEDURES PERFORMED:   Large joint arthrocentesis: R knee  Universal Protocol:  Consent: Verbal consent obtained  Risks and benefits: risks, benefits and alternatives were discussed  Consent given by: patient  Time out: Immediately prior to procedure a "time out" was called to verify the correct patient, procedure, equipment, support staff and site/side marked as required  Timeout called at: 3/14/2022 1:49 PM   Patient understanding: patient states understanding of the procedure being performed  Site marked: the operative site was marked  Radiology Images displayed and confirmed   If images not available, report reviewed: imaging studies available  Required items: required blood products, implants, devices, and special equipment available  Patient identity confirmed: verbally with patient    Supporting Documentation  Indications: pain   Procedure Details  Location: knee - R knee  Preparation: Patient was prepped and draped in the usual sterile fashion  Needle size: 22 G  Ultrasound guidance: no  Approach: superior  Medications administered: 3 mL sodium hyaluronate 60 MG/3ML    Patient tolerance: patient tolerated the procedure well with no immediate complications  Dressing:  Sterile dressing applied                  Jordy Argueta PA-C

## 2022-03-17 ENCOUNTER — DOCUMENTATION (OUTPATIENT)
Dept: NEUROSURGERY | Facility: CLINIC | Age: 77
End: 2022-03-17

## 2022-03-17 ENCOUNTER — OFFICE VISIT (OUTPATIENT)
Dept: NEUROSURGERY | Facility: CLINIC | Age: 77
End: 2022-03-17

## 2022-03-17 VITALS
RESPIRATION RATE: 16 BRPM | BODY MASS INDEX: 26.82 KG/M2 | SYSTOLIC BLOOD PRESSURE: 139 MMHG | TEMPERATURE: 98 F | HEART RATE: 73 BPM | DIASTOLIC BLOOD PRESSURE: 66 MMHG | WEIGHT: 198 LBS | HEIGHT: 72 IN

## 2022-03-17 DIAGNOSIS — G56.01 CARPAL TUNNEL SYNDROME OF RIGHT WRIST: ICD-10-CM

## 2022-03-17 DIAGNOSIS — Z09 POSTOPERATIVE EXAMINATION: Primary | ICD-10-CM

## 2022-03-17 PROCEDURE — 99024 POSTOP FOLLOW-UP VISIT: CPT | Performed by: NEUROLOGICAL SURGERY

## 2022-03-17 NOTE — PATIENT INSTRUCTIONS
Wear cock-up splint on right wrist day night for 1 week and then just during the day after this    If the symptoms fail then an EMG nerve conduction study would be important to obtain  If this EMG nerve conduction study demonstrates carpal tunnel syndrome than a return appointment with consideration for surgical intervention should be obtained

## 2022-03-17 NOTE — PROGRESS NOTES
DISCUSSION SUMMARY  This is a 64 y o  male who is 6 weeks out from a left hemilaminectomy and bilateral foraminotomies  He is doing very well in general   Physical therapy is important for him to pursue at this point  He also complains of carpal tunnel syndrome on the right wrist   I recommended that he use a cock-up splint and consider surgery if this is ineffective over time  In the meantime of ordered an EMG nerve conduction study to look into this further  Wear cock-up splint on right wrist day night for 1 week and then just during the day after this  If the symptoms fail then an EMG nerve conduction study would be important to obtain  If this EMG nerve conduction study demonstrates carpal tunnel syndrome than a return appointment with consideration for surgical intervention should be obtained    Return if symptoms worsen or fail to improve, for P r n  for a LS spine  May need to return for carpal tunnel  Diagnosis ICD-10-CM Associated Orders   1  Postoperative examination  Z09 Ambulatory referral to Physical Therapy     EMG 1 Limb   2  Carpal tunnel syndrome of right wrist  G56 01 EMG 1 Limb          Chief Complaint   Patient presents with    Post-op     6 weeks pov f/u for Lumbar radiculopathy, s/p ([facility]) Metrx L4-5 left hemilaminectomy and bilateral foraminotomy 2/4/2022]; NO NEW IMAGING       HPI 77-year-old male who complains of right knee arthralgias in some gait problems with a shuffling gait he also has joint swelling but primarily this is from his right knee arthralgia  His weakness in his legs has improved significantly since the time of the surgery  His pain in his legs has also improved significantly      Review of Systems   Constitutional: Negative  HENT: Negative  Eyes: Negative  Wears glasses for reading only   Respiratory: Negative  Cardiovascular: Negative  Gastrointestinal: Negative  Endocrine: Negative  Genitourinary: Negative    Negative for frequency and urgency  Musculoskeletal: Positive for arthralgias (right knee), gait problem (only with right knee pain; Shuffles when walking ) and joint swelling (right knee)  Negative for back pain and myalgias  Has not restarted PT    No Falls     Skin: Negative  Allergic/Immunologic: Negative  Neurological: Positive for weakness (bilateral legs, more muscle weakness)  Negative for dizziness, seizures, syncope, numbness and headaches  Hematological: Bruises/bleeds easily (patient on ASA 81)  Psychiatric/Behavioral: Negative  Negative for sleep disturbance  I reviewed the ROS      Vitals:    /66 (BP Location: Right arm, Patient Position: Sitting, Cuff Size: Standard)   Pulse 73   Temp 98 °F (36 7 °C) (Probe)   Resp 16   Ht 6' (1 829 m)   Wt 89 8 kg (198 lb)   BMI 26 85 kg/m²       MEDICAL HISTORY  Past Medical History:   Diagnosis Date    Arthritis     AV block, 1st degree     BPH (benign prostatic hyperplasia)     Chronic kidney disease     Stage 3    Colon polyp     COPD (chronic obstructive pulmonary disease) (HCC)     Diabetes mellitus (HCC)     takes metformin    Hyperlipidemia     Hypertension     Kidney stone     Spinal stenosis      Past Surgical History:   Procedure Laterality Date    CATARACT EXTRACTION      CATARACT EXTRACTION, BILATERAL      COLONOSCOPY      COLONOSCOPY      EPIDURAL BLOCK INJECTION N/A 10/19/2021    Procedure: L5 S1 INTERLAMINAR EPIDURAL STEROID INJECTION;  Surgeon: Trinda Meigs, MD;  [facility];  Service: Pain Management     EYE SURGERY      KIDNEY STONE SURGERY      CA LAMINEC/FACETECT/FORAMIN,LUMBAR 1 SEG Left 2/4/2022    Procedure: Metrx L4-5 left hemilaminectomy and bilateral foraminotomy;  Surgeon: Beryle Brew, MD;  [facility];  Service: Neurosurgery    PROSTATE SURGERY      RADIOFREQUENCY ABLATION Right 1/4/2022    Procedure: right genicular nerve radiofrequency ablation (3);  Surgeon: Jayce Pat Erica Whiteside MD;  Location[facility];  Service: Pain Management     TONSILLECTOMY AND ADENOIDECTOMY      TRANSURETHRAL RESECTION OF PROSTATE       Social History     Tobacco Use    Smoking status: Current Every Day Smoker     Packs/day: 0 50     Years: 35 00     Pack years: 17 50     Types: Cigarettes    Smokeless tobacco: Never Used   Vaping Use    Vaping Use: Never used   Substance Use Topics    Alcohol use: Not Currently    Drug use: Never        Current Outpatient Medications:     acetaminophen (TYLENOL) 325 mg tablet, Take 3 tablets (975 mg total) by mouth every 8 (eight) hours, Disp: , Rfl: 0    b complex vitamins capsule, Take 1 capsule by mouth daily  , Disp: , Rfl:     Cyanocobalamin (VITAMIN B-12 PO), Take by mouth in the morning, Disp: , Rfl:     hydrochlorothiazide (HYDRODIURIL) 25 mg tablet, Take by mouth daily  , Disp: , Rfl:     lisinopril (ZESTRIL) 20 mg tablet, Take 20 mg by mouth daily  , Disp: , Rfl:     metFORMIN (GLUCOPHAGE) 1000 MG tablet, Take 1,000 mg by mouth every evening With snack , Disp: , Rfl:     Specialty Vitamins Products (PROSTATE PO), Take by mouth in the morning, Disp: , Rfl:     tadalafil (CIALIS) 20 MG tablet, Take 20 mg by mouth as needed  , Disp: , Rfl:     TURMERIC PO, Take by mouth in the morning, Disp: , Rfl:     Vitamin D-Vitamin K (Vitamin K2-Vitamin D3)  MCG-UNIT CAPS, Take by mouth in the morning, Disp: , Rfl:     Empagliflozin 10 MG TABS, 10 mg in the morning   (Patient not taking: Reported on 3/17/2022 ), Disp: , Rfl:     ezetimibe (ZETIA) 10 mg tablet, Take 10 mg by mouth daily (Patient not taking: Reported on 3/17/2022 ), Disp: , Rfl:     methocarbamol (ROBAXIN) 500 mg tablet, Take 1 tablet (500 mg total) by mouth 4 (four) times a day for 7 days (Patient not taking: Reported on 3/4/2022 ), Disp: 28 tablet, Rfl: 0    NIACIN PO, Take by mouth in the morning (Patient not taking: Reported on 3/17/2022 ), Disp: , Rfl:    Allergies   Allergen Reactions    Simvastatin Other (See Comments)     Muscle weakness/joint pain        The following portions of the patient's history were updated by MA and reviewed by MD: allergies, current medications, past family history, past medical history, past social history, past surgical history and problem list       Physical Exam  Awake alert  He is ambulatory  His incision is clean and dry and intact  He is sitting comfortably in a chair  He does have numbness and tingling in the 1st through 3rd digits    Sensation is reduced by approximately 25% in these digits  He has a positive Tinel sign

## 2022-03-17 NOTE — PROGRESS NOTES
Pt lives over 2 hrs away he will schedule emg closer to home then call us for f/u apt with [facility] for f/u after _BA

## 2022-04-04 ENCOUNTER — OFFICE VISIT (OUTPATIENT)
Dept: OBGYN CLINIC | Facility: CLINIC | Age: 77
End: 2022-04-04
Payer: MEDICARE

## 2022-04-04 VITALS
DIASTOLIC BLOOD PRESSURE: 76 MMHG | HEART RATE: 60 BPM | BODY MASS INDEX: 26.82 KG/M2 | SYSTOLIC BLOOD PRESSURE: 160 MMHG | WEIGHT: 198 LBS | HEIGHT: 72 IN | TEMPERATURE: 97.1 F

## 2022-04-04 DIAGNOSIS — G89.29 CHRONIC PAIN OF RIGHT KNEE: ICD-10-CM

## 2022-04-04 DIAGNOSIS — M17.0 PRIMARY OSTEOARTHRITIS OF BOTH KNEES: Primary | ICD-10-CM

## 2022-04-04 DIAGNOSIS — M25.561 CHRONIC PAIN OF RIGHT KNEE: ICD-10-CM

## 2022-04-04 PROCEDURE — 99213 OFFICE O/P EST LOW 20 MIN: CPT | Performed by: ORTHOPAEDIC SURGERY

## 2022-04-04 PROCEDURE — 20610 DRAIN/INJ JOINT/BURSA W/O US: CPT | Performed by: ORTHOPAEDIC SURGERY

## 2022-04-04 RX ORDER — LIDOCAINE HYDROCHLORIDE 10 MG/ML
2 INJECTION, SOLUTION INFILTRATION; PERINEURAL
Status: COMPLETED | OUTPATIENT
Start: 2022-04-04 | End: 2022-04-04

## 2022-04-04 RX ORDER — TRIAMCINOLONE ACETONIDE 40 MG/ML
20 INJECTION, SUSPENSION INTRA-ARTICULAR; INTRAMUSCULAR
Status: COMPLETED | OUTPATIENT
Start: 2022-04-04 | End: 2022-04-04

## 2022-04-04 RX ADMIN — LIDOCAINE HYDROCHLORIDE 2 ML: 10 INJECTION, SOLUTION INFILTRATION; PERINEURAL at 10:55

## 2022-04-04 RX ADMIN — TRIAMCINOLONE ACETONIDE 20 MG: 40 INJECTION, SUSPENSION INTRA-ARTICULAR; INTRAMUSCULAR at 10:55

## 2022-04-04 NOTE — PROGRESS NOTES
ASSESSMENT/PLAN:    Diagnoses and all orders for this visit:    Primary osteoarthritis of both knees - Bilateral    Chronic pain of right knee        Plan:  I discussed his treatment options  Although I do not feel confident that injection of the medial tibial plateau will provide significant benefit, he was insistent that he wanted to try this understanding it was less likely to provide benefit than the previous injections  This was performed without difficulty  I will see him back as needed  He was encouraged to contact me if questions or concerns arise  He does understand that repeat Visco supplement injection cannot be performed until September of 2022  Return if symptoms worsen or fail to improve       _____________________________________________________  CHIEF COMPLAINT:  Chief Complaint   Patient presents with    Follow-up         SUBJECTIVE:  Tulio Silveira is a 64y o  year old male who presents for follow up of his right knee symptoms  He has noted excellent response to the Visco supplement injection performed about 3 weeks ago  However, he complains of pain localized to the medial tibial plateau just inferior to the joint surface  He has no pain at rest but states that any movement or standing, walking causes pain  He is currently scheduled to begin physical therapy ordered by his neurosurgeon for his back and lower extremity related symptoms        PAST MEDICAL HISTORY:  Past Medical History:   Diagnosis Date    Arthritis     AV block, 1st degree     BPH (benign prostatic hyperplasia)     Chronic kidney disease     Stage 3    Colon polyp     [facility]ch[facility] [address] [facility]     Diabetes mellitus ([facility] [address] [facility]     takes metformin    Hyperlipidemia     Hypertension     Kidney stone     Spinal stenosis        PAST SURGICAL HISTORY:  Past Surgical History:   Procedure Laterality Date    CATARACT EXTRACTION      CATARACT EXTRACTION, BILATERAL      COLONOSCOPY      COLONOSCOPY      EPIDURAL BLOCK INJECTION N/A 10/19/2021    Procedure: L5 S1 INTERLAMINAR EPIDURAL STEROID INJECTION;  Surgeon: Jannet Wilson MD;  [facility];  Service: Pain Management     EYE SURGERY      KIDNEY STONE SURGERY      W[place] LAMINEC/FACETECT/FORAMIN,LUMBAR 1 SEG Left 2/4/2022    Procedure: Metrx L4-5 left hemilaminectomy and bilateral foraminotomy;  Surgeon: Angelique Alex MD;  [facility];  Service: Neurosurgery    PROSTATE SURGERY      RADIOFREQUENCY ABLATION Right 1/4/2022    Procedure: right genicular nerve radiofrequency ablation (3);  Surgeon: Jannet Wilson MD;  [facility];  Service: Pain Management     TONSILLECTOMY AND ADENOIDECTOMY      TRANSURETHRAL RESECTION OF PROSTATE         FAMILY HISTORY:  Family History   Problem Relation Age of Onset    Diabetes Mother     No Known Problems Father        SOCIAL HISTORY:  Social History     Tobacco Use    Smoking status: Current Every Day Smoker     Packs/day: 0 50     Years: 35 00     Pack years: 17 50     Types: Cigarettes    Smokeless tobacco: Never Used   Vaping Use    Vaping Use: Never used   Substance Use Topics    Alcohol use: Not Currently    Drug use: Never       MEDICATIONS:    Current Outpatient Medications:     acetaminophen (TYLENOL) 325 mg tablet, Take 3 tablets (975 mg total) by mouth every 8 (eight) hours, Disp: , Rfl: 0    b complex vitamins capsule, Take 1 capsule by mouth daily  , Disp: , Rfl:     Cyanocobalamin (VITAMIN B-12 PO), Take by mouth in the morning, Disp: , Rfl:     hydrochlorothiazide (HYDRODIURIL) 25 mg tablet, Take by mouth daily  , Disp: , Rfl:     lisinopril (ZESTRIL) 20 mg tablet, Take 20 mg by mouth daily  , Disp: , Rfl:     metFORMIN (GLUCOPHAGE) 1000 MG tablet, Take 1,000 mg by mouth every evening With snack , Disp: , Rfl:     Specialty Vitamins Products (PROSTATE PO), Take by mouth in the morning, Disp: , Rfl:     tadalafil (CIALIS) 20 MG tablet, Take 20 mg by mouth as needed  , Disp: , Rfl:     TURMERIC PO, Take by mouth in the morning, Disp: , Rfl:     Vitamin D-Vitamin K (Vitamin K2-Vitamin D3)  MCG-UNIT CAPS, Take by mouth in the morning, Disp: , Rfl:     Empagliflozin 10 MG TABS, 10 mg in the morning   (Patient not taking: Reported on 3/17/2022 ), Disp: , Rfl:     ezetimibe (ZETIA) 10 mg tablet, Take 10 mg by mouth daily (Patient not taking: Reported on 3/17/2022 ), Disp: , Rfl:     methocarbamol (ROBAXIN) 500 mg tablet, Take 1 tablet (500 mg total) by mouth 4 (four) times a day for 7 days (Patient not taking: Reported on 3/4/2022 ), Disp: 28 tablet, Rfl: 0    NIACIN PO, Take by mouth in the morning (Patient not taking: Reported on 3/17/2022 ), Disp: , Rfl:     ALLERGIES:  Allergies   Allergen Reactions    Simvastatin Other (See Comments)     Muscle weakness/joint pain       REVIEW OF SYSTEMS:  Pertinent items are noted in HPI  A comprehensive review of systems was negative       _____________________________________________________  PHYSICAL EXAMINATION:  General: well developed and well nourished, alert and appears comfortable  Psychiatric: Normal  HEENT:  Normocephalic, atraumatic  Cardiovascular:  Regular  Pulmonary: No wheezing or stridor  Skin: No masses, erthema, lacerations, fluctation, ulcerations  Neurovascular: Motor and sensory exams are grossly intact and pulses palpable  MUSCULOSKELETAL EXAMINATION:    The right knee exam demonstrates no significant effusion  The skin is warm and dry  He has varus deformity  He complained of some tenderness over the medial portion of the tibial plateau just inferior to the joint surface when he was palpating the area  I was not able to reproduce localized tenderness  He does have some slight laxity with valgus stress, correcting his varus toward physiologic  He denies any pain  He has no joint line tenderness, femoral condylar tenderness or anterior tenderness        PROCEDURES PERFORMED:  Large joint arthrocentesis: R anserine bursa  Universal Protocol:  Consent: Verbal consent obtained    Consent given by: patient  Patient understanding: patient states understanding of the procedure being performed    Supporting Documentation  Indications: pain   Procedure Details  Location: knee - R anserine bursa  Needle size: 25 G  Ultrasound guidance: no  Approach: anteromedial  Medications administered: 2 mL lidocaine 1 %; 20 mg triamcinolone acetonide 40 mg/mL                  Aydin Bradley

## 2022-04-26 ENCOUNTER — DOCTOR'S OFFICE (OUTPATIENT)
Dept: URBAN - NONMETROPOLITAN AREA CLINIC 1 | Facility: CLINIC | Age: 77
Setting detail: OPHTHALMOLOGY
End: 2022-04-26
Payer: COMMERCIAL

## 2022-04-26 DIAGNOSIS — H04.122: ICD-10-CM

## 2022-04-26 DIAGNOSIS — H04.121: ICD-10-CM

## 2022-04-26 DIAGNOSIS — H35.373: ICD-10-CM

## 2022-04-26 DIAGNOSIS — Z96.1: ICD-10-CM

## 2022-04-26 DIAGNOSIS — E11.3293: ICD-10-CM

## 2022-04-26 DIAGNOSIS — Z79.84: ICD-10-CM

## 2022-04-26 DIAGNOSIS — H26.491: ICD-10-CM

## 2022-04-26 PROCEDURE — 99214 OFFICE O/P EST MOD 30 MIN: CPT | Performed by: OPHTHALMOLOGY

## 2022-04-26 PROCEDURE — 92134 CPTRZ OPH DX IMG PST SGM RTA: CPT | Performed by: OPHTHALMOLOGY

## 2022-04-26 ASSESSMENT — REFRACTION_MANIFEST
OS_SPHERE: -0.25
OS_VA2: 20/20-2
OS_CYLINDER: -0.50
OS_ADD: +2.50
OS_VA1: 20/20-2
OS_AXIS: 155
OD_CYLINDER: SPH
OD_VA2: 20/20-1
OD_SPHERE: PLANO
OD_VA1: 20/20-1
OD_ADD: +2.50

## 2022-04-26 ASSESSMENT — REFRACTION_CURRENTRX
OD_CYLINDER: 0.00
OD_SPHERE: +2.50
OS_AXIS: 149
OD_OVR_VA: 20/
OD_AXIS: 180
OS_SPHERE: +2.25
OS_CYLINDER: -0.50
OS_OVR_VA: 20/

## 2022-04-26 ASSESSMENT — REFRACTION_AUTOREFRACTION
OD_SPHERE: +0.50
OS_AXIS: 166
OS_SPHERE: +0.75
OD_AXIS: 168
OS_CYLINDER: -0.50
OD_CYLINDER: -1.50

## 2022-04-26 ASSESSMENT — VISUAL ACUITY
OD_BCVA: 20/25+1
OS_BCVA: 20/25

## 2022-04-26 ASSESSMENT — MACULA - EPIRETINAL MEMBRANE (ERM)
OS_ERM: T
OD_ERM: T

## 2022-04-26 ASSESSMENT — CONFRONTATIONAL VISUAL FIELD TEST (CVF)
OS_FINDINGS: FULL
OD_FINDINGS: FULL

## 2022-04-26 ASSESSMENT — SPHEQUIV_DERIVED
OS_SPHEQUIV: -0.5
OS_SPHEQUIV: 0.5
OD_SPHEQUIV: -0.25

## 2023-04-16 PROBLEM — M54.41 ACUTE BILATERAL LOW BACK PAIN WITH BILATERAL SCIATICA: Status: ACTIVE | Noted: 2023-04-16

## 2023-04-16 PROBLEM — M54.42 ACUTE BILATERAL LOW BACK PAIN WITH BILATERAL SCIATICA: Status: ACTIVE | Noted: 2023-04-16

## 2023-04-16 PROBLEM — Z98.890 STATUS POST LUMBAR LAMINECTOMY: Status: ACTIVE | Noted: 2023-04-16

## 2023-05-02 ENCOUNTER — HOSPITAL ENCOUNTER (OUTPATIENT)
Dept: MRI IMAGING | Facility: HOSPITAL | Age: 78
Discharge: HOME | End: 2023-05-02

## 2023-05-02 DIAGNOSIS — M54.42 ACUTE BILATERAL LOW BACK PAIN WITH BILATERAL SCIATICA: ICD-10-CM

## 2023-05-02 DIAGNOSIS — M54.41 ACUTE BILATERAL LOW BACK PAIN WITH BILATERAL SCIATICA: ICD-10-CM

## 2023-05-02 DIAGNOSIS — Z98.890 STATUS POST LUMBAR LAMINECTOMY: ICD-10-CM

## 2023-05-02 RX ADMIN — GADOBUTROL 9 ML: 604.72 INJECTION INTRAVENOUS at 12:27

## 2023-05-03 ENCOUNTER — HOSPITAL ENCOUNTER (OUTPATIENT)
Dept: RADIOLOGY | Facility: HOSPITAL | Age: 78
Discharge: HOME | End: 2023-05-03

## 2023-05-03 ENCOUNTER — DOCTOR'S OFFICE (OUTPATIENT)
Dept: URBAN - NONMETROPOLITAN AREA CLINIC 1 | Facility: CLINIC | Age: 78
Setting detail: OPHTHALMOLOGY
End: 2023-05-03
Payer: COMMERCIAL

## 2023-05-03 DIAGNOSIS — M54.42 ACUTE BILATERAL LOW BACK PAIN WITH BILATERAL SCIATICA: ICD-10-CM

## 2023-05-03 DIAGNOSIS — Z96.1: ICD-10-CM

## 2023-05-03 DIAGNOSIS — H10.13: ICD-10-CM

## 2023-05-03 DIAGNOSIS — M54.41 ACUTE BILATERAL LOW BACK PAIN WITH BILATERAL SCIATICA: ICD-10-CM

## 2023-05-03 DIAGNOSIS — H35.3131: ICD-10-CM

## 2023-05-03 DIAGNOSIS — Z98.890 STATUS POST LUMBAR LAMINECTOMY: ICD-10-CM

## 2023-05-03 DIAGNOSIS — H04.123: ICD-10-CM

## 2023-05-03 DIAGNOSIS — H26.491: ICD-10-CM

## 2023-05-03 DIAGNOSIS — Z79.84: ICD-10-CM

## 2023-05-03 DIAGNOSIS — H35.373: ICD-10-CM

## 2023-05-03 DIAGNOSIS — E11.3293: ICD-10-CM

## 2023-05-03 PROCEDURE — 83861 MICROFLUID ANALY TEARS: CPT | Performed by: OPHTHALMOLOGY

## 2023-05-03 PROCEDURE — 92014 COMPRE OPH EXAM EST PT 1/>: CPT | Performed by: OPHTHALMOLOGY

## 2023-05-03 PROCEDURE — 92134 CPTRZ OPH DX IMG PST SGM RTA: CPT | Performed by: OPHTHALMOLOGY

## 2023-05-03 ASSESSMENT — REFRACTION_CURRENTRX
OD_OVR_VA: 20/
OS_SPHERE: +2.25
OD_SPHERE: +2.50
OD_CYLINDER: 0.00
OS_OVR_VA: 20/
OD_AXIS: 180
OS_AXIS: 149
OS_CYLINDER: -0.50

## 2023-05-03 ASSESSMENT — REFRACTION_MANIFEST
OS_VA2: 20/20-2
OS_SPHERE: -0.25
OD_SPHERE: PLANO
OS_VA1: 20/20-2
OD_CYLINDER: SPH
OS_ADD: +2.50
OD_VA1: 20/20-1
OS_CYLINDER: -0.50
OD_VA2: 20/20-1
OD_ADD: +2.50
OS_AXIS: 155

## 2023-05-03 ASSESSMENT — CONFRONTATIONAL VISUAL FIELD TEST (CVF)
OS_FINDINGS: FULL
OD_FINDINGS: FULL

## 2023-05-03 ASSESSMENT — SPHEQUIV_DERIVED
OD_SPHEQUIV: -0.25
OS_SPHEQUIV: 0.5
OS_SPHEQUIV: -0.5

## 2023-05-03 ASSESSMENT — REFRACTION_AUTOREFRACTION
OD_AXIS: 168
OD_SPHERE: +0.50
OS_AXIS: 166
OD_CYLINDER: -1.50
OS_SPHERE: +0.75
OS_CYLINDER: -0.50

## 2023-05-03 ASSESSMENT — MACULA - EPIRETINAL MEMBRANE (ERM)
OD_ERM: T
OS_ERM: T

## 2023-05-03 ASSESSMENT — VISUAL ACUITY
OS_BCVA: 20/20-2
OD_BCVA: 20/20

## 2023-05-04 ENCOUNTER — OFFICE VISIT (OUTPATIENT)
Dept: NEUROSURGERY | Facility: CLINIC | Age: 78
End: 2023-05-04

## 2023-05-04 VITALS
OXYGEN SATURATION: 99 % | SYSTOLIC BLOOD PRESSURE: 118 MMHG | RESPIRATION RATE: 18 BRPM | BODY MASS INDEX: 27.51 KG/M2 | DIASTOLIC BLOOD PRESSURE: 64 MMHG | HEIGHT: 72 IN | HEART RATE: 61 BPM | TEMPERATURE: 98.7 F | WEIGHT: 203.1 LBS

## 2023-05-04 DIAGNOSIS — M54.41 ACUTE BILATERAL LOW BACK PAIN WITH BILATERAL SCIATICA: Primary | ICD-10-CM

## 2023-05-04 DIAGNOSIS — M54.42 ACUTE BILATERAL LOW BACK PAIN WITH BILATERAL SCIATICA: Primary | ICD-10-CM

## 2023-05-04 DIAGNOSIS — M96.1 LUMBAR POST-LAMINECTOMY SYNDROME: ICD-10-CM

## 2023-05-04 DIAGNOSIS — M17.0 PRIMARY OSTEOARTHRITIS OF BOTH KNEES: ICD-10-CM

## 2023-05-04 NOTE — ASSESSMENT & PLAN NOTE
· As addressed in HPI  · He reported is main concern now in the pain in lower thighs into the knees and to just below the knees, right greater than left than left  · He also reported a history of OA in bilateral knees right greater than left for which he was under care of orthopedics at [facility]  He has undergone knee injections and planning fopr injection with chicken gel into the right knee  as received knee injections for osteoarthritis  Request a recommendation for orthopedic Dr at [facility] , he wishes to change for [id number]   Plan  · Completed referral to [facility] for bilateral knee OA  · He received a call from ortho office,of Dr Shara Gray the end of our visit  He was offered and appointment and reminded he had established care with [facility]  And was last seen 3/14/22  · Patient told caller he needed to check his calender at home and will return call tomorrow and schedule and appointment

## 2023-05-04 NOTE — ASSESSMENT & PLAN NOTE
· 5/2/23 MRI lumbar spine w/wo L4- L5 here is enhancing granulation tissue within the laminectomy bed and posterior aspect of the central canal without significant impingement  of the thecal sac  · Symptoms of low back pain and pain in lower right thigh into just bvelow the right knee

## 2023-05-04 NOTE — PROGRESS NOTES
Assessment/Plan:    Primary osteoarthritis of both knees - Bilateral  · As addressed in HPI  · He reported is main concern now in the pain in lower thighs into the knees and to just below the knees, right greater than left than left  · He also reported a history of OA in bilateral knees right greater than left for which he was under care of orthopedics at [facility]  He has undergone knee injections and planning fopr injection with chicken gel into the right knee  as received knee injections for osteoarthritis  Request a recommendation for orthopedic Dr at [facility] , he wishes to change for [address]  Plan  · Completed referral to [facility] for bilateral knee OA  · He received a call from ortho office,of Dr Carmela Frias the end of our visit  He was offered and appointment and reminded he had established care with [facility]  And was last seen 3/14/22  · Patient told caller he needed to check his calender at home and will return call tomorrow and schedule and appointment  Lumbar post-laminectomy syndrome  · 5/2/23 MRI lumbar spine w/wo L4- L5 here is enhancing granulation tissue within the laminectomy bed and posterior aspect of the central canal without significant impingement  of the thecal sac  · Symptoms of low back pain and pain in lower right thigh into just bvelow the right knee  Acute bilateral low back pain with bilateral sciatica  · As addressed in HPI  Reports the following   · Is feeling better, the pain across his back is nearly almost gone  · Has persistent pain in the posterior aspect of lower bilateral thighs down into  back of knees to just below the knees  He attributes his continued pain to the severity of his osteoarthritis  · He remarked when the pain started after lifting the weights he was concerned he did something t his back and wanted to get it checked  · Reports He is not interested in back surgery at this time    · He denies cauda equina    · Reports he is not interested in restarting physical therapy , and declined an order   Reports he still has the exercises form before  Imagining  · 5/2/2023 MRI Lumbar Spine w/wo    New bilateral laminectomies are noted at the L4-5 level with improved patency of the central canal compared to 10/12/2021  The previously noted small central and right paracentral disc protrusion at L4-5 appears less prominent  There is persistent bilateral subarticular/lateral recess narrowing at this level  Enhancing granulation tissue is noted within the laminectomy bed and posterior aspect of the central canal without significant impingement of the thecal sac  2  Stable degenerative disc disease at the L1-2 through L3-4 and L5-S1 levels as detailed with no new disc herniation  Bilateral renal cortical cysts, largest located at the lower pole of the right kidney  · 5/3/22 Xray Lumbar spine 6 V No acute osseous abnormality  Degenerative changes as described  No evidence for dynamic instability on flexion or extension  Plan   · Reviewed MRI Lumbar in detail   · Reviewed Xray Lumbar spine    · Reviewed red flag signs , if develop report to the ED for assessment   · If symptoms return and worsen contact office fr f/u appointment with Dr Jojo Gonsales  · Advised to resume care with Dr Jas Yu , schedule appointment to discuss opportunity for interventional treatment  · Advised to resume HEP previously provided by Physical therapy and perform on a regular schedule, the goal is to strengthen muscles lumbar core, paraspinal , quads , glutes, hamstrings to off load weight on spine,  --He agrees to resume  · Advised if additional questions or concerns call the office  There are no diagnoses linked to this encounter      Subjective: Presents for f/u vist for imagining review and reassessment     Patient ID: Nya Martell is a [de-identified] y o  male     HPI   He presented on 4/14/2023 with complaint of new onset low back pain with radicular  symptoms after a repetition of lifting a weight off the floor and up over his head   He started with lumbar back pain in the location of prior surgical incision with distribution up into upper lumbar area, and down  into both buttocks and down posterior thighs into the lower calfs   He also reported chronic bilateral knee pain from osteoarthritis  He has a longstanding history of tingling in his feet with no diminished sensation  He rated the severity of symptoms as 9/10these   Hs a HO PREVIOUS SPINAL SURGERY: SX 2/4 2022 Metrx L4-5 left hemilaminectomy and bilateral foraminotomy (Left: Spine Lumbar)Spinal stenosis of lumbar region with neurogenic claudication  Lumbar radiculopathy, , Dr IVY    MULTIMODAL TREATMENTS:  PT --- none since symptom onset  PM --none since symptom onset, but has an established relation with Dr Abiodun Franco last document OV 2/28/22   He reported calling this office initially and reported new pain symptoms, was referred to contact neurosurgery  Chiropractor 2 times last Friday and again on Tuesday , a Dr in [place]   Had 2 treatments with no change in symptoms  Imagining was ordered , MRI lumbar spine w/wout HO prior surgeyr and new onset symptoms  , prior surgery and Xray lumbar flexion an extension, status post laminectomies and bilateral foraminotomies    He returns today for imagining review and reassessment  REVIEW OF SYSTEMS  Review of Systems   Gastrointestinal: Negative  Genitourinary: Negative  Musculoskeletal: Positive for arthralgias (knees), back pain (low back not radiating down legs) and gait problem (no falls )  Skin: Negative  Neurological: Positive for weakness (legs, not new knees) and numbness (N/T, neuropathy bottom of feet, not new)  Hematological: Bruises/bleeds easily (omega 3)  Psychiatric/Behavioral: Negative for sleep disturbance           Meds/Allergies     Current Outpatient Medications   Medication Sig Dispense Refill    acetaminophen (TYLENOL) 325 mg tablet Take 3 tablets (975 mg total) by mouth every 8 (eight) hours  0    amLODIPine (NORVASC) 10 mg tablet Take 10 mg by mouth daily      Ascorbic Acid (VITAMIN C PO) Take 500 mg by mouth daily      b complex vitamins capsule Take 1 capsule by mouth daily        Cholecalciferol (D3-1000) 25 MCG (1000 UT) capsule Take 1,000 Units by mouth daily      CINNAMON PO Take 1,000 mg by mouth daily      Cyanocobalamin (B-12 PO) Take 1,000 mcg by mouth daily      Cyanocobalamin (VITAMIN B-12 PO) Take by mouth in the morning      Empagliflozin 10 MG TABS 10 mg in the morning      ezetimibe (ZETIA) 10 mg tablet Take 10 mg by mouth daily      GARLIC PO Take 5,855 mg by mouth daily      hydrochlorothiazide (HYDRODIURIL) 25 mg tablet Take by mouth daily        lisinopril (ZESTRIL) 40 mg tablet Take 40 mg by mouth daily      MAGNESIUM PO Take 250 mg by mouth daily      Menaquinone-7 (K2 PO) Take 100 mcg by mouth daily      metFORMIN (GLUCOPHAGE) 1000 MG tablet Take 1,000 mg by mouth 2 (two) times a day with meals With snack      milk thistle 175 MG tablet Take 175 mg by mouth daily      NIACIN PO Take by mouth in the morning      Omega-3 Fatty Acids (OMEGA 3 PO) Take 500 mg by mouth daily      Specialty Vitamins Products (PROSTATE PO) Take by mouth if needed      tadalafil (CIALIS) 20 MG tablet Take 20 mg by mouth as needed        TURMERIC PO Take by mouth in the morning      VITAMIN A PO Take 2,400 mcg by mouth daily      Vitamin D-Vitamin K (Vitamin K2-Vitamin D3)  MCG-UNIT CAPS Take by mouth in the morning      WHEY PROTEIN PO Take by mouth daily      methocarbamol (ROBAXIN) 500 mg tablet Take 1 tablet (500 mg total) by mouth 4 (four) times a day for 7 days (Patient not taking: Reported on 3/4/2022) 28 tablet 0     No current facility-administered medications for this visit         Allergies   Allergen Reactions    Simvastatin Other (See Comments) Muscle weakness/joint pain       PAST HISTORY    Past Medical History:   Diagnosis Date    Arthritis     AV block, 1st degree     BPH (benign prostatic hyperplasia)     Chronic kidney disease     Stage 3    Colon polyp     COPD (chronic obstructive pulmonary disease) ([address] )     Diabetes mellitus (HCC)     takes metformin    Hyperlipidemia     Hypertension     Kidney stone     Spinal stenosis        Past Surgical History:   Procedure Laterality Date    CATARACT EXTRACTION      CATARACT EXTRACTION, BILATERAL      COLONOSCOPY      COLONOSCOPY      EPIDURAL BLOCK INJECTION N/A 10/19/2021    Procedure: L5 S1 INTERLAMINAR EPIDURAL STEROID INJECTION;  Surgeon: Samia Daniels MD;  [facility];  Service: Pain Management     EYE SURGERY      KIDNEY STONE SURGERY      WY BOWSER FACETECTOMY & FORAMOTOMY 1 VRT SGM LUMBAR Left 2022    Procedure: Metrx L4-5 left hemilaminectomy and bilateral foraminotomy;  Surgeon: Josefina Talbot MD;  [facility];  Service: Neurosurgery    PROSTATE SURGERY      RADIOFREQUENCY ABLATION Right 2022    Procedure: right genicular nerve radiofrequency ablation (3);  Surgeon: Samia Daniels MD;  Location: [facility];  Service: Pain Management     TONSILLECTOMY AND ADENOIDECTOMY      TRANSURETHRAL RESECTION OF PROSTATE         Social History     Tobacco Use    Smoking status: Former     Packs/day: 0 50     Years: 35 00     Pack years: 17 50     Types: Cigarettes     Quit date: 2023     Years since quittin 1    Smokeless tobacco: Never   Vaping Use    Vaping Use: Never used   Substance Use Topics    Alcohol use: Not Currently    Drug use: Never       Family History   Problem Relation Age of Onset    Diabetes Mother     No Known Problems Father        The following portions of the patient's history were reviewed and updated as appropriate: allergies, current medications, past family history, past medical history, past social history, past surgical history and problem list       EXAM    Vitals:Blood pressure 118/64, pulse 61, temperature 98 7 °F (37 1 °C), resp  rate 18, height 6' (1 829 m), weight 92 1 kg (203 lb 1 6 oz), SpO2 99 %  ,Body mass index is 27 55 kg/m²  Physical Exam  Vitals and nursing note reviewed  Constitutional:       Appearance: Normal appearance  He is normal weight  Comments: Well groomed    HENT:      Head: Normocephalic and atraumatic  Pulmonary:      Effort: Pulmonary effort is normal  No respiratory distress  Musculoskeletal:      Right lower leg: No edema  Left lower leg: No edema  Skin:     General: Skin is warm and dry  Neurological:      General: No focal deficit present  Mental Status: He is alert and oriented to person, place, and time  Gait: Gait is intact  Deep Tendon Reflexes:      Reflex Scores:       Patellar reflexes are 2+ on the right side and 2+ on the left side  Achilles reflexes are 1+ on the right side and 1+ on the left side  Psychiatric:         Mood and Affect: Mood normal          Behavior: Behavior normal          Neurologic Exam     Mental Status   Oriented to person, place, and time     Level of consciousness: alert    Motor Exam   Right leg tone: normal  Left leg tone: normal    Strength   Right iliopsoas: 5/5  Left iliopsoas: 5/5  Right quadriceps: 5/5  Left quadriceps: 5/5  Right hamstrin/5  Left hamstrin/5  Right glutei: 5/5  Left glutei: 5/5  Right anterior tibial: 5/5  Left anterior tibial: 5/5  Right gastroc: 5/5  Left gastroc: 5/5    Gait, Coordination, and Reflexes     Gait  Gait: normal    Reflexes   Right patellar: 2+  Left patellar: 2+  Right achilles: 1+  Left achilles: 1+  Right ankle clonus: absent  Left ankle clonus: absent      Imaging Studies  XR spine lumbar complete w bending minimum 6 views    Result Date: 5/3/2023  Narrative: LUMBAR SPINE INDICATION:   Z98 890: Other specified postprocedural states M54 42: Lumbago with sciatica, left side M54 41: Lumbago with sciatica, right side  COMPARISON: 5/2/2023, MRI VIEWS:  XR SPINE LUMBAR COMPLETE W BENDING MINIMUM 6 VIEWS Images: 7 FINDINGS: There are 5 non rib bearing lumbar vertebral bodies  There is no evidence of acute fracture or destructive osseous lesion  There is mild chronic appearing loss of stature of L1 unchanged since yesterday  Moderate multilevel degenerative changes involving discs and facet joints  Mild retrolisthesis of L1 on L2 and L2 on L3 without change on flexion or extension  The pedicles appear intact  No pars defects  Soft tissues are unremarkable  Impression: No acute osseous abnormality  Degenerative changes as described  No evidence for dynamic instability on flexion or extension  Workstation performed: [id number]     MRI lumbar spine with and without contrast    Result Date: 5/2/2023  Narrative: MRI LUMBAR SPINE WITH AND WITHOUT CONTRAST INDICATION: Z98 [id number]: Other specified postprocedural states M54 42: Lumbago with sciatica, left side M54 41: Lumbago with sciatica, right side  COMPARISON: MRI lumbar spine 10/12/2021 TECHNIQUE:  Multiplanar, multisequence imaging of the lumbar spine was performed before and after gadolinium administration    IV Contrast:  9 mL of Gadobutrol injection (SINGLE-DOSE) IMAGE QUALITY:  Diagnostic FINDINGS: VERTEBRAL BODIES:  There are 5 lumbar type vertebral bodies  Normal alignment of the lumbar spine  No spondylolysis or spondylolisthesis  No scoliosis  No compression fracture  Multilevel hemangiomas are again noted, most notable at the T12 and L4 levels  SACRUM:  Normal signal within the sacrum  No evidence of insufficiency or stress fracture  DISTAL CORD AND CONUS:  Normal size and signal within the distal cord and conus  The conus terminates at the L1 level  The cauda equina nerve roots appear within normal limits  PARASPINAL SOFT TISSUES:  Paraspinal soft tissues are unremarkable   LOWER THORACIC DISC SPACES:  Normal disc height and signal   No disc herniation, canal stenosis or foraminal narrowing  LUMBAR DISC SPACES: L1-2: Stable mild disc diffuse disc bulge with a superimposed tiny right paracentral disc protrusion  No central canal or  subarticular/lateral recess narrowing  No neural foraminal stenosis  L2-3: Stable mild diffuse disc bulge with bilateral ligamentum flavum and facet hypertrophy  Mild central canal and bilateral subarticular/lateral recess narrowing  Mild bilateral neural foraminal stenosis is again noted  L3-4: Mild to moderate diffuse disc bulge is again noted extending into the foraminal region  Bilateral ligamentum flavum and facet hypertrophy  Mild to moderate central canal and bilateral subarticular/lateral recess narrowing  Mild bilateral neural foraminal stenosis  L4-5: Moderate diffuse disc bulge is again noted  The previously noted small central and right paracentral disc protrusions appear less prominent compared to the prior study  New bilateral laminectomies noted with improved patency of the central canal  There is persistent subarticular/lateral recess narrowing  Mild bilateral neural foraminal stenosis is again noted  There is enhancing granulation tissue within the laminectomy bed and posterior aspect of the central canal without significant impingement  of the thecal sac  L5-S1: Mild diffuse disc bulge without central canal or neural foraminal narrowing  OTHER FINDINGS: Bilateral renal cortical cysts, largest located at the lower pole of the right kidney measuring 8 cm     Impression: 1  New bilateral laminectomies are noted at the L4-5 level with improved patency of the central canal compared to 10/12/2021  The previously noted small central and right paracentral disc protrusion at L4-5 appears less prominent  There is persistent bilateral subarticular/lateral recess narrowing at this level   Enhancing granulation tissue is noted within the laminectomy bed and posterior aspect of the central canal without significant impingement of the thecal sac  2  Stable degenerative disc disease at the L1-2 through L3-4 and L5-S1 levels as detailed with no new disc herniation  3  Bilateral renal cortical cysts, largest located at the lower pole of the right kidney  Workstation performed: [id number]       I have personally reviewed pertinent reports  and I have personally reviewed pertinent films in PACS    I have spent a total time of 30 minutes on 05/04/23 in caring for this patient including Diagnostic results, Risks and benefits of tx options, Instructions for management, Patient and family education, Importance of tx compliance, Risk factor reductions, Impressions, Counseling / Coordination of care, Documenting in the medical record, Reviewing / ordering tests, medicine, procedures   and Obtaining or reviewing history

## 2023-05-05 NOTE — ASSESSMENT & PLAN NOTE
· As addressed in HPI  Reports the following   · Is feeling better, the pain across his back is nearly almost gone  · Has persistent pain in the posterior aspect of lower bilateral thighs down into  back of knees to just below the knees  He attributes his continued pain to the severity of his osteoarthritis  · He remarked when the pain started after lifting the weights he was concerned he did something t his back and wanted to get it checked  · Reports He is not interested in back surgery at this time  · He denies cauda equina    · Reports he is not interested in restarting physical therapy , and declined an order   Reports he still has the exercises form before  Imagining  · 5/2/2023 MRI Lumbar Spine w/wo    New bilateral laminectomies are noted at the L4-5 level with improved patency of the central canal compared to 10/12/2021  The previously noted small central and right paracentral disc protrusion at L4-5 appears less prominent  There is persistent bilateral subarticular/lateral recess narrowing at this level  Enhancing granulation tissue is noted within the laminectomy bed and posterior aspect of the central canal without significant impingement of the thecal sac  2  Stable degenerative disc disease at the L1-2 through L3-4 and L5-S1 levels as detailed with no new disc herniation  Bilateral renal cortical cysts, largest located at the lower pole of the right kidney  · 5/3/22 Xray Lumbar spine 6 V No acute osseous abnormality  Degenerative changes as described  No evidence for dynamic instability on flexion or extension  Plan   · Reviewed MRI Lumbar in detail   · Reviewed Xray Lumbar spine    · Reviewed red flag signs , if develop report to the ED for assessment   · If symptoms return and worsen contact office fr f/u appointment with Dr Prudencio Maldonado  · Advised to resume care with Dr Ryan Chatman , schedule appointment to discuss opportunity for interventional treatment    · Advised to resume HEP previously provided by Physical therapy and perform on a regular schedule, the goal is to strengthen muscles lumbar core, paraspinal , quads , glutes, hamstrings to off load weight on spine,  --He agrees to resume  · Advised if additional questions or concerns call the office

## 2023-10-31 ENCOUNTER — DOCTOR'S OFFICE (OUTPATIENT)
Dept: URBAN - NONMETROPOLITAN AREA CLINIC 1 | Facility: CLINIC | Age: 78
Setting detail: OPHTHALMOLOGY
End: 2023-10-31
Payer: COMMERCIAL

## 2023-10-31 DIAGNOSIS — Z96.1: ICD-10-CM

## 2023-10-31 DIAGNOSIS — H04.123: ICD-10-CM

## 2023-10-31 DIAGNOSIS — E11.9: ICD-10-CM

## 2023-10-31 DIAGNOSIS — H35.373: ICD-10-CM

## 2023-10-31 DIAGNOSIS — Z79.84: ICD-10-CM

## 2023-10-31 DIAGNOSIS — H43.813: ICD-10-CM

## 2023-10-31 DIAGNOSIS — H33.22: ICD-10-CM

## 2023-10-31 DIAGNOSIS — H35.3131: ICD-10-CM

## 2023-10-31 DIAGNOSIS — H26.491: ICD-10-CM

## 2023-10-31 PROCEDURE — 92134 CPTRZ OPH DX IMG PST SGM RTA: CPT | Performed by: OPHTHALMOLOGY

## 2023-10-31 PROCEDURE — 99215 OFFICE O/P EST HI 40 MIN: CPT | Performed by: OPHTHALMOLOGY

## 2023-10-31 ASSESSMENT — REFRACTION_CURRENTRX
OS_CYLINDER: -0.50
OS_SPHERE: +2.25
OD_CYLINDER: 0.00
OD_AXIS: 180
OS_AXIS: 149
OD_SPHERE: +2.50
OS_OVR_VA: 20/
OD_OVR_VA: 20/

## 2023-10-31 ASSESSMENT — REFRACTION_MANIFEST
OS_CYLINDER: -0.50
OD_SPHERE: PLANO
OS_ADD: +2.50
OS_VA1: 20/20-2
OD_VA1: 20/20-1
OS_AXIS: 155
OS_SPHERE: -0.25
OD_CYLINDER: SPH
OD_VA2: 20/20-1
OS_VA2: 20/20-2
OD_ADD: +2.50

## 2023-10-31 ASSESSMENT — MACULA - EPIRETINAL MEMBRANE (ERM)
OS_ERM: T
OD_ERM: T

## 2023-10-31 ASSESSMENT — SPHEQUIV_DERIVED
OS_SPHEQUIV: -0.5
OS_SPHEQUIV: 0.625
OD_SPHEQUIV: 0.125

## 2023-10-31 ASSESSMENT — CONFRONTATIONAL VISUAL FIELD TEST (CVF)
OS_FINDINGS: FULL
OD_FINDINGS: FULL

## 2023-10-31 ASSESSMENT — REFRACTION_AUTOREFRACTION
OD_CYLINDER: -0.75
OS_AXIS: 107
OD_AXIS: 141
OS_CYLINDER: -0.25
OS_SPHERE: +0.75
OD_SPHERE: +0.50

## 2023-10-31 ASSESSMENT — VISUAL ACUITY
OS_BCVA: 20/25-1
OD_BCVA: 20/25-2

## 2023-11-03 ENCOUNTER — RX ONLY (RX ONLY)
Age: 78
End: 2023-11-03

## 2023-11-03 ENCOUNTER — DOCTOR'S OFFICE (OUTPATIENT)
Dept: URBAN - NONMETROPOLITAN AREA CLINIC 1 | Facility: CLINIC | Age: 78
Setting detail: OPHTHALMOLOGY
End: 2023-11-03
Payer: COMMERCIAL

## 2023-11-03 DIAGNOSIS — H11.32: ICD-10-CM

## 2023-11-03 DIAGNOSIS — H33.22: ICD-10-CM

## 2023-11-03 DIAGNOSIS — Z96.1: ICD-10-CM

## 2023-11-03 PROBLEM — H43.813 POSTERIOR VITREOUS DETACHMENT; BOTH EYES: Status: ACTIVE | Noted: 2023-10-31

## 2023-11-03 PROBLEM — E11.9 DIABETES TYPE 2 NO RETINOPATHY: Status: ACTIVE | Noted: 2023-10-31

## 2023-11-03 PROCEDURE — 99212 OFFICE O/P EST SF 10 MIN: CPT | Performed by: OPHTHALMOLOGY

## 2023-11-03 ASSESSMENT — SPHEQUIV_DERIVED
OS_SPHEQUIV: -0.5
OS_SPHEQUIV: 0.625
OD_SPHEQUIV: 0.25

## 2023-11-03 ASSESSMENT — REFRACTION_MANIFEST
OS_AXIS: 155
OS_VA2: 20/20-2
OD_VA2: 20/20-1
OS_VA1: 20/20-2
OD_SPHERE: PLANO
OD_ADD: +2.50
OS_CYLINDER: -0.50
OD_VA1: 20/20-1
OS_ADD: +2.50
OD_CYLINDER: SPH
OS_SPHERE: -0.25

## 2023-11-03 ASSESSMENT — MACULA - EPIRETINAL MEMBRANE (ERM)
OD_ERM: T
OS_ERM: T

## 2023-11-03 ASSESSMENT — VISUAL ACUITY
OD_BCVA: CF 1FT
OS_BCVA: 20/20-1

## 2023-11-03 ASSESSMENT — AXIALLENGTH_DERIVED
OD_AL: 23.4042
OS_AL: 23.5322
OS_AL: 23.9765

## 2023-11-03 ASSESSMENT — REFRACTION_AUTOREFRACTION
OS_AXIS: 107
OS_CYLINDER: -0.25
OD_SPHERE: +0.75
OD_AXIS: 115
OS_SPHERE: +0.75
OD_CYLINDER: -1.00

## 2023-11-03 ASSESSMENT — KERATOMETRY
OS_AXISANGLE_DEGREES: 173
OS_K2POWER_DIOPTERS: 42.75
OD_K2POWER_DIOPTERS: 44.00
OD_AXISANGLE_DEGREES: 65
OD_K1POWER_DIOPTERS: 43.50
OS_K1POWER_DIOPTERS: 43.25

## 2023-11-03 ASSESSMENT — CONFRONTATIONAL VISUAL FIELD TEST (CVF)
OD_FINDINGS: FULL
OS_FINDINGS: FULL

## 2023-11-03 ASSESSMENT — REFRACTION_CURRENTRX
OD_CYLINDER: 0.00
OD_AXIS: 180
OD_SPHERE: +2.50
OD_OVR_VA: 20/
OS_SPHERE: +2.25
OS_AXIS: 149
OS_CYLINDER: -0.50
OS_OVR_VA: 20/

## 2023-11-03 ASSESSMENT — TONOMETRY: OD_IOP_MMHG: 11

## 2023-11-08 ENCOUNTER — DOCTOR'S OFFICE (OUTPATIENT)
Dept: URBAN - NONMETROPOLITAN AREA CLINIC 1 | Facility: CLINIC | Age: 78
Setting detail: OPHTHALMOLOGY
End: 2023-11-08
Payer: COMMERCIAL

## 2023-11-08 DIAGNOSIS — Z96.1: ICD-10-CM

## 2023-11-08 DIAGNOSIS — H33.22: ICD-10-CM

## 2023-11-08 DIAGNOSIS — H04.123: ICD-10-CM

## 2023-11-08 DIAGNOSIS — H11.32: ICD-10-CM

## 2023-11-08 PROCEDURE — 99213 OFFICE O/P EST LOW 20 MIN: CPT | Performed by: OPHTHALMOLOGY

## 2023-11-08 PROCEDURE — 92134 CPTRZ OPH DX IMG PST SGM RTA: CPT | Performed by: OPHTHALMOLOGY

## 2023-11-08 PROCEDURE — 76512 OPH US DX B-SCAN: CPT | Mod: LT | Performed by: OPHTHALMOLOGY

## 2023-11-08 ASSESSMENT — REFRACTION_AUTOREFRACTION
OD_CYLINDER: -1.00
OD_AXIS: 115
OD_SPHERE: +0.75
OS_SPHERE: +0.75
OS_AXIS: 107
OS_CYLINDER: -0.25

## 2023-11-08 ASSESSMENT — KERATOMETRY
OD_K2POWER_DIOPTERS: 44.00
OD_AXISANGLE_DEGREES: 65
OS_K2POWER_DIOPTERS: 42.75
OS_AXISANGLE_DEGREES: 173
OD_K1POWER_DIOPTERS: 43.50
OS_K1POWER_DIOPTERS: 43.25

## 2023-11-08 ASSESSMENT — CONFRONTATIONAL VISUAL FIELD TEST (CVF)
OS_FINDINGS: FULL
OD_FINDINGS: FULL

## 2023-11-08 ASSESSMENT — REFRACTION_MANIFEST
OS_AXIS: 155
OD_ADD: +2.50
OS_CYLINDER: -0.50
OD_VA1: 20/20-1
OS_VA2: 20/20-2
OD_SPHERE: PLANO
OS_SPHERE: -0.25
OD_VA2: 20/20-1
OS_VA1: 20/20-2
OS_ADD: +2.50
OD_CYLINDER: SPH

## 2023-11-08 ASSESSMENT — MACULA - EPIRETINAL MEMBRANE (ERM)
OS_ERM: T
OD_ERM: T

## 2023-11-08 ASSESSMENT — REFRACTION_CURRENTRX
OS_SPHERE: +2.25
OS_CYLINDER: -0.50
OD_AXIS: 180
OD_OVR_VA: 20/
OD_CYLINDER: 0.00
OS_AXIS: 149
OS_OVR_VA: 20/
OD_SPHERE: +2.50

## 2023-11-08 ASSESSMENT — AXIALLENGTH_DERIVED
OS_AL: 23.5322
OD_AL: 23.4042
OS_AL: 23.9765

## 2023-11-08 ASSESSMENT — VISUAL ACUITY
OS_BCVA: 20/25+2
OD_BCVA: CF 1FT

## 2023-11-08 ASSESSMENT — SPHEQUIV_DERIVED
OD_SPHEQUIV: 0.25
OS_SPHEQUIV: 0.625
OS_SPHEQUIV: -0.5

## 2024-02-06 ENCOUNTER — DOCTOR'S OFFICE (OUTPATIENT)
Dept: URBAN - NONMETROPOLITAN AREA CLINIC 1 | Facility: CLINIC | Age: 79
Setting detail: OPHTHALMOLOGY
End: 2024-02-06
Payer: COMMERCIAL

## 2024-02-06 DIAGNOSIS — E11.9: ICD-10-CM

## 2024-02-06 DIAGNOSIS — H43.813: ICD-10-CM

## 2024-02-06 DIAGNOSIS — H33.22: ICD-10-CM

## 2024-02-06 DIAGNOSIS — Z79.84: ICD-10-CM

## 2024-02-06 DIAGNOSIS — E11.3293: ICD-10-CM

## 2024-02-06 DIAGNOSIS — H04.123: ICD-10-CM

## 2024-02-06 PROCEDURE — 99213 OFFICE O/P EST LOW 20 MIN: CPT | Performed by: OPHTHALMOLOGY

## 2024-02-06 PROCEDURE — 92134 CPTRZ OPH DX IMG PST SGM RTA: CPT | Performed by: OPHTHALMOLOGY

## 2024-02-06 ASSESSMENT — MACULA - EPIRETINAL MEMBRANE (ERM)
OS_ERM: T
OD_ERM: T

## 2024-02-27 ENCOUNTER — RX ONLY (RX ONLY)
Age: 79
End: 2024-02-27

## 2024-02-27 ENCOUNTER — DOCTOR'S OFFICE (OUTPATIENT)
Dept: URBAN - NONMETROPOLITAN AREA CLINIC 1 | Facility: CLINIC | Age: 79
Setting detail: OPHTHALMOLOGY
End: 2024-02-27
Payer: COMMERCIAL

## 2024-02-27 DIAGNOSIS — H43.813: ICD-10-CM

## 2024-02-27 DIAGNOSIS — H35.373: ICD-10-CM

## 2024-02-27 DIAGNOSIS — H35.3131: ICD-10-CM

## 2024-02-27 DIAGNOSIS — H04.123: ICD-10-CM

## 2024-02-27 DIAGNOSIS — H33.22: ICD-10-CM

## 2024-02-27 PROBLEM — E11.9 DIABETES TYPE 2 NO RETINOPATHY; BOTH MILD WITHOUT ME: Status: ACTIVE | Noted: 2024-02-06

## 2024-02-27 PROBLEM — E11.3293 DIABETES TYPE 2 NO RETINOPATHY; BOTH MILD WITHOUT ME: Status: ACTIVE | Noted: 2024-02-06

## 2024-02-27 PROCEDURE — 76512 OPH US DX B-SCAN: CPT | Mod: LT | Performed by: OPHTHALMOLOGY

## 2024-02-27 PROCEDURE — 92134 CPTRZ OPH DX IMG PST SGM RTA: CPT | Performed by: OPHTHALMOLOGY

## 2024-02-27 PROCEDURE — 99213 OFFICE O/P EST LOW 20 MIN: CPT | Performed by: OPHTHALMOLOGY

## 2024-02-27 ASSESSMENT — MACULA - EPIRETINAL MEMBRANE (ERM)
OD_ERM: T
OS_ERM: T

## 2024-02-27 ASSESSMENT — CONFRONTATIONAL VISUAL FIELD TEST (CVF)
OD_FINDINGS: FULL
OS_FINDINGS: FULL

## 2024-03-10 NOTE — TELEPHONE ENCOUNTER
Patient states someone was just setting up an appt on 3/14 for his knee inj  He wanted to know if that would be at the Dr's office or hospital  No appt set up yet  He will call back Monday to check 
08:56

## 2024-03-28 ENCOUNTER — DOCTOR'S OFFICE (OUTPATIENT)
Dept: URBAN - NONMETROPOLITAN AREA CLINIC 1 | Facility: CLINIC | Age: 79
Setting detail: OPHTHALMOLOGY
End: 2024-03-28
Payer: COMMERCIAL

## 2024-03-28 VITALS — HEIGHT: 60 IN

## 2024-03-28 DIAGNOSIS — H35.3131: ICD-10-CM

## 2024-03-28 DIAGNOSIS — E11.3293: ICD-10-CM

## 2024-03-28 DIAGNOSIS — E11.9: ICD-10-CM

## 2024-03-28 DIAGNOSIS — H33.22: ICD-10-CM

## 2024-03-28 DIAGNOSIS — H35.373: ICD-10-CM

## 2024-03-28 PROCEDURE — 92250 FUNDUS PHOTOGRAPHY W/I&R: CPT | Performed by: OPHTHALMOLOGY

## 2024-03-28 PROCEDURE — 99213 OFFICE O/P EST LOW 20 MIN: CPT | Performed by: OPHTHALMOLOGY

## 2024-03-28 PROCEDURE — 92235 FLUORESCEIN ANGRPH MLTIFRAME: CPT | Performed by: OPHTHALMOLOGY

## 2024-04-04 ENCOUNTER — DOCTOR'S OFFICE (OUTPATIENT)
Dept: URBAN - NONMETROPOLITAN AREA CLINIC 1 | Facility: CLINIC | Age: 79
Setting detail: OPHTHALMOLOGY
End: 2024-04-04
Payer: COMMERCIAL

## 2024-04-04 ENCOUNTER — RX ONLY (RX ONLY)
Age: 79
End: 2024-04-04

## 2024-04-04 DIAGNOSIS — H35.373: ICD-10-CM

## 2024-04-04 DIAGNOSIS — H43.813: ICD-10-CM

## 2024-04-04 DIAGNOSIS — H33.22: ICD-10-CM

## 2024-04-04 DIAGNOSIS — H04.123: ICD-10-CM

## 2024-04-04 DIAGNOSIS — H35.3131: ICD-10-CM

## 2024-04-04 PROCEDURE — 99213 OFFICE O/P EST LOW 20 MIN: CPT | Performed by: OPHTHALMOLOGY

## 2024-04-04 PROCEDURE — 83861 MICROFLUID ANALY TEARS: CPT | Performed by: OPHTHALMOLOGY

## 2024-04-04 PROCEDURE — 92134 CPTRZ OPH DX IMG PST SGM RTA: CPT | Performed by: OPHTHALMOLOGY

## 2024-05-21 ENCOUNTER — DOCTOR'S OFFICE (OUTPATIENT)
Dept: URBAN - NONMETROPOLITAN AREA CLINIC 1 | Facility: CLINIC | Age: 79
Setting detail: OPHTHALMOLOGY
End: 2024-05-21
Payer: COMMERCIAL

## 2024-05-21 DIAGNOSIS — H33.22: ICD-10-CM

## 2024-05-21 DIAGNOSIS — E11.9: ICD-10-CM

## 2024-05-21 DIAGNOSIS — H35.3131: ICD-10-CM

## 2024-05-21 DIAGNOSIS — H35.373: ICD-10-CM

## 2024-05-21 PROCEDURE — 92134 CPTRZ OPH DX IMG PST SGM RTA: CPT | Performed by: OPHTHALMOLOGY

## 2024-05-21 PROCEDURE — 99213 OFFICE O/P EST LOW 20 MIN: CPT | Performed by: OPHTHALMOLOGY

## 2024-05-21 ASSESSMENT — MACULA - EPIRETINAL MEMBRANE (ERM)
OD_ERM: T
OS_ERM: T

## 2024-05-21 ASSESSMENT — CONFRONTATIONAL VISUAL FIELD TEST (CVF)
OS_FINDINGS: FULL
OD_FINDINGS: FULL

## 2024-05-23 ENCOUNTER — DOCTOR'S OFFICE (OUTPATIENT)
Dept: URBAN - NONMETROPOLITAN AREA CLINIC 1 | Facility: CLINIC | Age: 79
Setting detail: OPHTHALMOLOGY
End: 2024-05-23
Payer: COMMERCIAL

## 2024-05-23 DIAGNOSIS — H04.122: ICD-10-CM

## 2024-05-23 DIAGNOSIS — E11.9: ICD-10-CM

## 2024-05-23 DIAGNOSIS — H35.373: ICD-10-CM

## 2024-05-23 DIAGNOSIS — H35.3131: ICD-10-CM

## 2024-05-23 DIAGNOSIS — H04.123: ICD-10-CM

## 2024-05-23 DIAGNOSIS — H33.22: ICD-10-CM

## 2024-05-23 PROCEDURE — 99214 OFFICE O/P EST MOD 30 MIN: CPT | Performed by: OPHTHALMOLOGY

## 2024-05-23 PROCEDURE — 83861 MICROFLUID ANALY TEARS: CPT | Mod: QW,LT | Performed by: OPHTHALMOLOGY

## 2024-05-23 PROCEDURE — 83861 MICROFLUID ANALY TEARS: CPT | Performed by: OPHTHALMOLOGY

## 2024-05-23 ASSESSMENT — CONFRONTATIONAL VISUAL FIELD TEST (CVF)
OS_FINDINGS: FULL
OD_FINDINGS: FULL

## 2024-05-28 ENCOUNTER — DOCTOR'S OFFICE (OUTPATIENT)
Dept: URBAN - NONMETROPOLITAN AREA CLINIC 1 | Facility: CLINIC | Age: 79
Setting detail: OPHTHALMOLOGY
End: 2024-05-28
Payer: COMMERCIAL

## 2024-05-28 DIAGNOSIS — H52.222: ICD-10-CM

## 2024-05-28 DIAGNOSIS — H52.4: ICD-10-CM

## 2024-05-28 PROBLEM — H35.40 PERIPAPILLARY ATROPHY ; BOTH EYES: Status: ACTIVE | Noted: 2024-05-21

## 2024-05-28 PROBLEM — H04.122 DRY EYE; RIGHT EYE, LEFT EYE: Status: ACTIVE | Noted: 2024-05-23

## 2024-05-28 PROBLEM — H04.121 DRY EYE; RIGHT EYE, LEFT EYE: Status: ACTIVE | Noted: 2024-05-23

## 2024-05-28 PROCEDURE — 92012 INTRM OPH EXAM EST PATIENT: CPT | Performed by: OPTOMETRIST

## 2024-05-28 ASSESSMENT — CONFRONTATIONAL VISUAL FIELD TEST (CVF)
OS_FINDINGS: FULL
OD_FINDINGS: FULL

## 2024-06-17 ENCOUNTER — DOCTOR'S OFFICE (OUTPATIENT)
Dept: URBAN - NONMETROPOLITAN AREA CLINIC 1 | Facility: CLINIC | Age: 79
Setting detail: OPHTHALMOLOGY
End: 2024-06-17
Payer: COMMERCIAL

## 2024-06-17 DIAGNOSIS — E11.3293: ICD-10-CM

## 2024-06-17 DIAGNOSIS — H35.3131: ICD-10-CM

## 2024-06-17 DIAGNOSIS — H04.122: ICD-10-CM

## 2024-06-17 DIAGNOSIS — H33.22: ICD-10-CM

## 2024-06-17 DIAGNOSIS — H04.123: ICD-10-CM

## 2024-06-17 DIAGNOSIS — H35.373: ICD-10-CM

## 2024-06-17 DIAGNOSIS — E11.9: ICD-10-CM

## 2024-06-17 PROBLEM — H53.122 SUBJECTIVE VISUAL DISTURBANCE; RIGHT EYE, LEFT EYE: Status: ACTIVE | Noted: 2024-06-17

## 2024-06-17 PROBLEM — H53.121 SUBJECTIVE VISUAL DISTURBANCE; RIGHT EYE, LEFT EYE: Status: ACTIVE | Noted: 2024-06-17

## 2024-06-17 PROCEDURE — 99214 OFFICE O/P EST MOD 30 MIN: CPT | Performed by: OPHTHALMOLOGY

## 2024-06-17 PROCEDURE — 83861 MICROFLUID ANALY TEARS: CPT | Performed by: OPHTHALMOLOGY

## 2024-06-17 PROCEDURE — 92134 CPTRZ OPH DX IMG PST SGM RTA: CPT | Performed by: OPHTHALMOLOGY

## 2024-06-17 PROCEDURE — 83861 MICROFLUID ANALY TEARS: CPT | Mod: QW,LT | Performed by: OPHTHALMOLOGY

## 2024-06-17 ASSESSMENT — CONFRONTATIONAL VISUAL FIELD TEST (CVF)
OS_FINDINGS: FULL
OD_FINDINGS: FULL

## 2024-07-15 ENCOUNTER — DOCTOR'S OFFICE (OUTPATIENT)
Dept: URBAN - NONMETROPOLITAN AREA CLINIC 1 | Facility: CLINIC | Age: 79
Setting detail: OPHTHALMOLOGY
End: 2024-07-15
Payer: COMMERCIAL

## 2024-07-15 DIAGNOSIS — H33.22: ICD-10-CM

## 2024-07-15 DIAGNOSIS — H35.373: ICD-10-CM

## 2024-07-15 DIAGNOSIS — H43.813: ICD-10-CM

## 2024-07-15 DIAGNOSIS — H35.3131: ICD-10-CM

## 2024-07-15 PROCEDURE — 99213 OFFICE O/P EST LOW 20 MIN: CPT | Performed by: OPHTHALMOLOGY

## 2024-07-15 PROCEDURE — 92134 CPTRZ OPH DX IMG PST SGM RTA: CPT | Performed by: OPHTHALMOLOGY

## 2024-07-15 ASSESSMENT — CONFRONTATIONAL VISUAL FIELD TEST (CVF)
OS_FINDINGS: FULL
OD_FINDINGS: FULL

## 2024-07-22 ENCOUNTER — DOCTOR'S OFFICE (OUTPATIENT)
Dept: URBAN - NONMETROPOLITAN AREA CLINIC 1 | Facility: CLINIC | Age: 79
Setting detail: OPHTHALMOLOGY
End: 2024-07-22
Payer: COMMERCIAL

## 2024-07-22 DIAGNOSIS — H35.3131: ICD-10-CM

## 2024-07-22 DIAGNOSIS — H43.813: ICD-10-CM

## 2024-07-22 DIAGNOSIS — H33.22: ICD-10-CM

## 2024-07-22 DIAGNOSIS — H35.373: ICD-10-CM

## 2024-07-22 PROCEDURE — 92235 FLUORESCEIN ANGRPH MLTIFRAME: CPT | Performed by: OPHTHALMOLOGY

## 2024-07-22 PROCEDURE — 99213 OFFICE O/P EST LOW 20 MIN: CPT | Performed by: OPHTHALMOLOGY

## 2024-07-22 ASSESSMENT — CONFRONTATIONAL VISUAL FIELD TEST (CVF)
OD_FINDINGS: FULL
OS_FINDINGS: FULL

## 2024-08-12 ENCOUNTER — DOCTOR'S OFFICE (OUTPATIENT)
Dept: URBAN - NONMETROPOLITAN AREA CLINIC 1 | Facility: CLINIC | Age: 79
Setting detail: OPHTHALMOLOGY
End: 2024-08-12
Payer: COMMERCIAL

## 2024-08-12 DIAGNOSIS — H04.123: ICD-10-CM

## 2024-08-12 DIAGNOSIS — H35.373: ICD-10-CM

## 2024-08-12 DIAGNOSIS — H33.22: ICD-10-CM

## 2024-08-12 DIAGNOSIS — H35.3131: ICD-10-CM

## 2024-08-12 DIAGNOSIS — H43.813: ICD-10-CM

## 2024-08-12 PROCEDURE — 99213 OFFICE O/P EST LOW 20 MIN: CPT | Performed by: OPHTHALMOLOGY

## 2024-08-12 PROCEDURE — 92134 CPTRZ OPH DX IMG PST SGM RTA: CPT | Performed by: OPHTHALMOLOGY

## 2024-08-12 PROCEDURE — 83861 MICROFLUID ANALY TEARS: CPT | Performed by: OPHTHALMOLOGY

## 2024-08-12 ASSESSMENT — CONFRONTATIONAL VISUAL FIELD TEST (CVF)
OS_FINDINGS: FULL
OD_FINDINGS: FULL

## 2024-09-16 ENCOUNTER — DOCTOR'S OFFICE (OUTPATIENT)
Dept: URBAN - NONMETROPOLITAN AREA CLINIC 1 | Facility: CLINIC | Age: 79
Setting detail: OPHTHALMOLOGY
End: 2024-09-16
Payer: COMMERCIAL

## 2024-09-16 DIAGNOSIS — H53.122: ICD-10-CM

## 2024-09-16 DIAGNOSIS — H33.22: ICD-10-CM

## 2024-09-16 DIAGNOSIS — H04.123: ICD-10-CM

## 2024-09-16 DIAGNOSIS — H43.813: ICD-10-CM

## 2024-09-16 DIAGNOSIS — E11.9: ICD-10-CM

## 2024-09-16 DIAGNOSIS — H35.373: ICD-10-CM

## 2024-09-16 DIAGNOSIS — H35.3131: ICD-10-CM

## 2024-09-16 DIAGNOSIS — E11.3293: ICD-10-CM

## 2024-09-16 PROCEDURE — 92134 CPTRZ OPH DX IMG PST SGM RTA: CPT | Performed by: OPHTHALMOLOGY

## 2024-09-16 PROCEDURE — 99213 OFFICE O/P EST LOW 20 MIN: CPT | Performed by: OPHTHALMOLOGY

## 2024-09-16 ASSESSMENT — REFRACTION_MANIFEST
OD_CYLINDER: -0.50
OD_SPHERE: PLANO
OS_ADD: +2.50
OS_CYLINDER: -0.75
OD_VA2: 20/20-1
OD_AXIS: 135
OD_ADD: +2.50
OD_VA1: 20/20-1
OS_VA1: 20/25-2
OS_AXIS: 075
OS_SPHERE: PLANO
OS_VA2: 20/25-2

## 2024-09-16 ASSESSMENT — REFRACTION_AUTOREFRACTION
OD_CYLINDER: -1.25
OD_AXIS: 157
OS_AXIS: 023
OD_SPHERE: +0.75
OS_CYLINDER: -0.50
OS_SPHERE: 0.00

## 2024-09-16 ASSESSMENT — CONFRONTATIONAL VISUAL FIELD TEST (CVF)
OS_FINDINGS: FULL
OD_FINDINGS: FULL

## 2024-09-16 ASSESSMENT — VISUAL ACUITY
OD_BCVA: 20/30+2
OS_BCVA: 20/20-2

## 2024-09-16 ASSESSMENT — REFRACTION_CURRENTRX
OD_SPHERE: +2.50
OS_AXIS: 149
OD_OVR_VA: 20/
OD_AXIS: 180
OS_CYLINDER: -0.50
OS_OVR_VA: 20/
OD_CYLINDER: 0.00
OS_SPHERE: +2.25

## 2024-09-16 ASSESSMENT — KERATOMETRY
OD_AXISANGLE_DEGREES: 65
OD_K1POWER_DIOPTERS: 43.50
OS_K1POWER_DIOPTERS: 43.25
OS_AXISANGLE_DEGREES: 173
OS_K2POWER_DIOPTERS: 42.75
OD_K2POWER_DIOPTERS: 44.00

## 2024-09-25 ENCOUNTER — DOCTOR'S OFFICE (OUTPATIENT)
Dept: URBAN - NONMETROPOLITAN AREA CLINIC 1 | Facility: CLINIC | Age: 79
Setting detail: OPHTHALMOLOGY
End: 2024-09-25
Payer: COMMERCIAL

## 2024-09-25 DIAGNOSIS — H43.813: ICD-10-CM

## 2024-09-25 DIAGNOSIS — H35.3131: ICD-10-CM

## 2024-09-25 DIAGNOSIS — H04.123: ICD-10-CM

## 2024-09-25 DIAGNOSIS — H35.373: ICD-10-CM

## 2024-09-25 DIAGNOSIS — H53.122: ICD-10-CM

## 2024-09-25 DIAGNOSIS — E11.3293: ICD-10-CM

## 2024-09-25 DIAGNOSIS — Z79.84: ICD-10-CM

## 2024-09-25 PROBLEM — H35.372 ERM; LEFT EYE: Status: ACTIVE | Noted: 2024-09-25

## 2024-09-25 PROCEDURE — 92012 INTRM OPH EXAM EST PATIENT: CPT | Performed by: OPHTHALMOLOGY

## 2024-09-25 PROCEDURE — 92083 EXTENDED VISUAL FIELD XM: CPT | Performed by: OPHTHALMOLOGY

## 2024-09-25 ASSESSMENT — REFRACTION_MANIFEST
OS_VA1: 20/25-2
OD_AXIS: 135
OS_ADD: +2.50
OD_CYLINDER: -0.50
OS_AXIS: 075
OS_VA2: 20/25-2
OS_SPHERE: PLANO
OD_VA2: 20/20-1
OD_ADD: +2.50
OS_CYLINDER: -0.75
OD_VA1: 20/20-1
OD_SPHERE: PLANO

## 2024-09-25 ASSESSMENT — REFRACTION_CURRENTRX
OS_SPHERE: +2.25
OD_SPHERE: +2.50
OD_AXIS: 180
OS_AXIS: 149
OD_OVR_VA: 20/
OS_CYLINDER: -0.50
OS_OVR_VA: 20/
OD_CYLINDER: 0.00

## 2024-09-25 ASSESSMENT — VISUAL ACUITY
OD_BCVA: 20/25+2
OS_BCVA: 20/20-2

## 2024-09-25 ASSESSMENT — REFRACTION_AUTOREFRACTION
OS_AXIS: 023
OD_CYLINDER: -1.25
OD_AXIS: 157
OS_SPHERE: 0.00
OD_SPHERE: +0.75
OS_CYLINDER: -0.50

## 2024-09-25 ASSESSMENT — KERATOMETRY
OS_AXISANGLE_DEGREES: 173
OD_K2POWER_DIOPTERS: 44.00
OD_AXISANGLE_DEGREES: 65
OS_K1POWER_DIOPTERS: 43.25
OS_K2POWER_DIOPTERS: 42.75
OD_K1POWER_DIOPTERS: 43.50

## 2024-09-25 ASSESSMENT — CONFRONTATIONAL VISUAL FIELD TEST (CVF)
OS_FINDINGS: FULL
OD_FINDINGS: FULL

## 2024-09-25 ASSESSMENT — MACULA - EPIRETINAL MEMBRANE (ERM)
OS_ERM: T
OD_ERM: T

## 2024-10-07 ENCOUNTER — DOCTOR'S OFFICE (OUTPATIENT)
Dept: URBAN - NONMETROPOLITAN AREA CLINIC 1 | Facility: CLINIC | Age: 79
Setting detail: OPHTHALMOLOGY
End: 2024-10-07
Payer: COMMERCIAL

## 2024-10-07 DIAGNOSIS — H04.123: ICD-10-CM

## 2024-10-07 DIAGNOSIS — H35.373: ICD-10-CM

## 2024-10-07 DIAGNOSIS — E11.3293: ICD-10-CM

## 2024-10-07 DIAGNOSIS — H35.3131: ICD-10-CM

## 2024-10-07 DIAGNOSIS — H50.52: ICD-10-CM

## 2024-10-07 DIAGNOSIS — H43.813: ICD-10-CM

## 2024-10-07 PROCEDURE — 92060 SENSORIMOTOR EXAMINATION: CPT | Performed by: OPHTHALMOLOGY

## 2024-10-07 PROCEDURE — 99214 OFFICE O/P EST MOD 30 MIN: CPT | Performed by: OPHTHALMOLOGY

## 2024-10-07 ASSESSMENT — REFRACTION_MANIFEST
OD_AXIS: 135
OD_ADD: +2.50
OS_VA2: 20/25-2
OS_CYLINDER: -0.75
OD_VA1: 20/20-1
OS_VA1: 20/25-2
OS_SPHERE: PLANO
OD_CYLINDER: -0.50
OD_SPHERE: PLANO
OS_AXIS: 075
OS_ADD: +2.50
OD_VA2: 20/20-1

## 2024-10-07 ASSESSMENT — REFRACTION_CURRENTRX
OS_SPHERE: +2.25
OD_SPHERE: +2.50
OS_AXIS: 149
OD_OVR_VA: 20/
OS_CYLINDER: -0.50
OD_AXIS: 180
OS_OVR_VA: 20/
OD_CYLINDER: 0.00

## 2024-10-07 ASSESSMENT — REFRACTION_AUTOREFRACTION
OS_SPHERE: +0.00
OD_AXIS: 028
OD_SPHERE: +0.25
OD_CYLINDER: +0.50
OS_AXIS: 173
OS_CYLINDER: +0.75

## 2024-10-07 ASSESSMENT — CONFRONTATIONAL VISUAL FIELD TEST (CVF)
OS_FINDINGS: FULL
OD_FINDINGS: FULL

## 2024-10-07 ASSESSMENT — KERATOMETRY
OD_K1POWER_DIOPTERS: 43.50
OS_AXISANGLE_DEGREES: 173
OD_K2POWER_DIOPTERS: 44.00
OS_K2POWER_DIOPTERS: 42.75
OS_K1POWER_DIOPTERS: 43.25
OD_AXISANGLE_DEGREES: 65

## 2024-10-07 ASSESSMENT — MACULA - EPIRETINAL MEMBRANE (ERM)
OD_ERM: T
OS_ERM: T

## 2024-10-07 ASSESSMENT — TONOMETRY: OD_IOP_MMHG: 17

## 2024-10-07 ASSESSMENT — VISUAL ACUITY
OS_BCVA: 20/30+2
OD_BCVA: 20/40+1

## 2024-10-14 ENCOUNTER — DOCTOR'S OFFICE (OUTPATIENT)
Dept: URBAN - NONMETROPOLITAN AREA CLINIC 1 | Facility: CLINIC | Age: 79
Setting detail: OPHTHALMOLOGY
End: 2024-10-14
Payer: COMMERCIAL

## 2024-10-14 DIAGNOSIS — E11.3293: ICD-10-CM

## 2024-10-14 DIAGNOSIS — H43.813: ICD-10-CM

## 2024-10-14 DIAGNOSIS — H35.3131: ICD-10-CM

## 2024-10-14 DIAGNOSIS — H35.373: ICD-10-CM

## 2024-10-14 PROBLEM — H50.52 EXOPHORIA: Status: ACTIVE | Noted: 2024-10-07

## 2024-10-14 PROBLEM — E11.3291 DM TYPE 2; RIGHT MILD WITHOUT ME, LEFT MILD WITHOUT ME: Status: ACTIVE | Noted: 2024-10-07

## 2024-10-14 PROBLEM — E11.3292 DM TYPE 2; RIGHT MILD WITHOUT ME, LEFT MILD WITHOUT ME: Status: ACTIVE | Noted: 2024-10-07

## 2024-10-14 PROBLEM — H04.121 DRY EYE; RIGHT EYE, LEFT EYE: Status: ACTIVE | Noted: 2024-10-14

## 2024-10-14 PROBLEM — H04.122 DRY EYE; RIGHT EYE, LEFT EYE: Status: ACTIVE | Noted: 2024-10-14

## 2024-10-14 PROCEDURE — 92134 CPTRZ OPH DX IMG PST SGM RTA: CPT | Performed by: OPHTHALMOLOGY

## 2024-10-14 PROCEDURE — 92012 INTRM OPH EXAM EST PATIENT: CPT | Performed by: OPHTHALMOLOGY

## 2024-10-14 ASSESSMENT — REFRACTION_AUTOREFRACTION
OD_CYLINDER: +0.50
OS_CYLINDER: +0.75
OD_SPHERE: +0.25
OS_AXIS: 173
OD_AXIS: 028
OS_SPHERE: +0.00

## 2024-10-14 ASSESSMENT — REFRACTION_MANIFEST
OS_VA2: 20/25-2
OS_ADD: +2.50
OS_VA1: 20/25-2
OD_SPHERE: PLANO
OS_SPHERE: PLANO
OD_ADD: +2.50
OD_AXIS: 135
OD_VA1: 20/20-1
OS_AXIS: 075
OD_CYLINDER: -0.50
OD_VA2: 20/20-1
OS_CYLINDER: -0.75

## 2024-10-14 ASSESSMENT — KERATOMETRY
OD_AXISANGLE_DEGREES: 65
OD_K2POWER_DIOPTERS: 44.00
OS_K1POWER_DIOPTERS: 43.25
OS_AXISANGLE_DEGREES: 173
OD_K1POWER_DIOPTERS: 43.50
OS_K2POWER_DIOPTERS: 42.75

## 2024-10-14 ASSESSMENT — VISUAL ACUITY
OD_BCVA: 20/25-1
OS_BCVA: 20/25-1

## 2024-10-14 ASSESSMENT — REFRACTION_CURRENTRX
OS_OVR_VA: 20/
OD_OVR_VA: 20/
OD_AXIS: 180
OS_AXIS: 149
OD_CYLINDER: 0.00
OD_SPHERE: +2.50
OS_SPHERE: +2.25
OS_CYLINDER: -0.50

## 2024-10-14 ASSESSMENT — CONFRONTATIONAL VISUAL FIELD TEST (CVF)
OS_FINDINGS: FULL
OD_FINDINGS: FULL

## 2024-11-25 ENCOUNTER — DOCTOR'S OFFICE (OUTPATIENT)
Dept: URBAN - NONMETROPOLITAN AREA CLINIC 1 | Facility: CLINIC | Age: 79
Setting detail: OPHTHALMOLOGY
End: 2024-11-25
Payer: COMMERCIAL

## 2024-11-25 DIAGNOSIS — H43.813: ICD-10-CM

## 2024-11-25 DIAGNOSIS — H35.373: ICD-10-CM

## 2024-11-25 DIAGNOSIS — H35.3131: ICD-10-CM

## 2024-11-25 DIAGNOSIS — H04.123: ICD-10-CM

## 2024-11-25 DIAGNOSIS — E11.3293: ICD-10-CM

## 2024-11-25 DIAGNOSIS — Z79.84: ICD-10-CM

## 2024-11-25 PROCEDURE — 99213 OFFICE O/P EST LOW 20 MIN: CPT | Performed by: OPHTHALMOLOGY

## 2024-11-25 PROCEDURE — 92134 CPTRZ OPH DX IMG PST SGM RTA: CPT | Performed by: OPHTHALMOLOGY

## 2024-11-25 ASSESSMENT — VISUAL ACUITY
OD_BCVA: 20/20-2
OS_BCVA: 20/30+1

## 2024-11-25 ASSESSMENT — REFRACTION_MANIFEST
OS_VA1: 20/25-2
OD_AXIS: 135
OD_ADD: +2.50
OS_ADD: +2.50
OD_SPHERE: PLANO
OD_VA2: 20/20-1
OS_SPHERE: PLANO
OS_AXIS: 075
OS_VA2: 20/25-2
OD_VA1: 20/20-1
OS_CYLINDER: -0.75
OD_CYLINDER: -0.50

## 2024-11-25 ASSESSMENT — REFRACTION_AUTOREFRACTION
OS_AXIS: 173
OS_SPHERE: +0.00
OD_CYLINDER: +0.50
OD_AXIS: 028
OS_CYLINDER: +0.75
OD_SPHERE: +0.25

## 2024-11-25 ASSESSMENT — REFRACTION_CURRENTRX
OS_CYLINDER: -0.50
OS_SPHERE: +2.25
OD_CYLINDER: 0.00
OD_SPHERE: +2.50
OS_OVR_VA: 20/
OD_AXIS: 180
OD_OVR_VA: 20/
OS_AXIS: 149

## 2024-11-25 ASSESSMENT — KERATOMETRY
OS_K2POWER_DIOPTERS: 42.75
OS_AXISANGLE_DEGREES: 173
OD_AXISANGLE_DEGREES: 65
OD_K1POWER_DIOPTERS: 43.50
OS_K1POWER_DIOPTERS: 43.25
OD_K2POWER_DIOPTERS: 44.00

## 2024-11-25 ASSESSMENT — CONFRONTATIONAL VISUAL FIELD TEST (CVF)
OD_FINDINGS: FULL
OS_FINDINGS: FULL

## 2025-01-27 ENCOUNTER — DOCTOR'S OFFICE (OUTPATIENT)
Dept: URBAN - NONMETROPOLITAN AREA CLINIC 1 | Facility: CLINIC | Age: 80
Setting detail: OPHTHALMOLOGY
End: 2025-01-27
Payer: COMMERCIAL

## 2025-01-27 DIAGNOSIS — H35.373: ICD-10-CM

## 2025-01-27 DIAGNOSIS — H35.3131: ICD-10-CM

## 2025-01-27 DIAGNOSIS — E11.3293: ICD-10-CM

## 2025-01-27 PROCEDURE — 92134 CPTRZ OPH DX IMG PST SGM RTA: CPT | Performed by: OPHTHALMOLOGY

## 2025-01-27 PROCEDURE — 99213 OFFICE O/P EST LOW 20 MIN: CPT | Performed by: OPHTHALMOLOGY

## 2025-01-27 ASSESSMENT — REFRACTION_MANIFEST
OD_ADD: +2.50
OD_VA1: 20/20-1
OS_ADD: +2.50
OS_VA1: 20/25-2
OD_CYLINDER: -0.50
OS_SPHERE: PLANO
OD_VA2: 20/20-1
OD_AXIS: 135
OS_CYLINDER: -0.75
OS_VA2: 20/25-2
OS_AXIS: 075
OD_SPHERE: PLANO

## 2025-01-27 ASSESSMENT — KERATOMETRY
OD_K1POWER_DIOPTERS: 43.50
OD_AXISANGLE_DEGREES: 65
OS_AXISANGLE_DEGREES: 173
OD_K2POWER_DIOPTERS: 44.00
OS_K2POWER_DIOPTERS: 42.75
OS_K1POWER_DIOPTERS: 43.25

## 2025-01-27 ASSESSMENT — REFRACTION_CURRENTRX
OD_AXIS: 180
OS_CYLINDER: -0.50
OD_SPHERE: +2.50
OS_AXIS: 149
OS_OVR_VA: 20/
OD_OVR_VA: 20/
OS_SPHERE: +2.25
OD_CYLINDER: 0.00

## 2025-01-27 ASSESSMENT — MACULA - EPIRETINAL MEMBRANE (ERM)
OD_ERM: T
OS_ERM: T

## 2025-01-27 ASSESSMENT — REFRACTION_AUTOREFRACTION
OD_SPHERE: +0.25
OD_AXIS: 028
OD_CYLINDER: +0.50
OS_AXIS: 173
OS_CYLINDER: +0.75
OS_SPHERE: +0.00

## 2025-01-27 ASSESSMENT — VISUAL ACUITY
OD_BCVA: 20/25+1
OS_BCVA: 20/20

## 2025-01-27 ASSESSMENT — CONFRONTATIONAL VISUAL FIELD TEST (CVF)
OD_FINDINGS: FULL
OS_FINDINGS: FULL

## 2025-02-10 ENCOUNTER — DOCTOR'S OFFICE (OUTPATIENT)
Dept: URBAN - NONMETROPOLITAN AREA CLINIC 1 | Facility: CLINIC | Age: 80
Setting detail: OPHTHALMOLOGY
End: 2025-02-10
Payer: COMMERCIAL

## 2025-02-10 DIAGNOSIS — E11.3293: ICD-10-CM

## 2025-02-10 DIAGNOSIS — H35.3131: ICD-10-CM

## 2025-02-10 DIAGNOSIS — H35.373: ICD-10-CM

## 2025-02-10 PROCEDURE — 92235 FLUORESCEIN ANGRPH MLTIFRAME: CPT | Performed by: OPHTHALMOLOGY

## 2025-02-10 PROCEDURE — 99213 OFFICE O/P EST LOW 20 MIN: CPT | Performed by: OPHTHALMOLOGY

## 2025-02-10 PROCEDURE — 92250 FUNDUS PHOTOGRAPHY W/I&R: CPT | Performed by: OPHTHALMOLOGY

## 2025-02-10 ASSESSMENT — REFRACTION_MANIFEST
OD_ADD: +2.50
OD_VA2: 20/20-1
OD_AXIS: 135
OS_VA2: 20/25-2
OS_AXIS: 075
OS_VA1: 20/25-2
OS_ADD: +2.50
OS_SPHERE: PLANO
OD_CYLINDER: -0.50
OD_VA1: 20/20-1
OD_SPHERE: PLANO
OS_CYLINDER: -0.75

## 2025-02-10 ASSESSMENT — REFRACTION_CURRENTRX
OS_AXIS: 149
OD_AXIS: 180
OS_OVR_VA: 20/
OS_SPHERE: +2.25
OD_SPHERE: +2.50
OS_CYLINDER: -0.50
OD_CYLINDER: 0.00
OD_OVR_VA: 20/

## 2025-02-10 ASSESSMENT — KERATOMETRY
OD_K1POWER_DIOPTERS: 43.50
OD_AXISANGLE_DEGREES: 65
OS_K1POWER_DIOPTERS: 43.25
OS_AXISANGLE_DEGREES: 173
OD_K2POWER_DIOPTERS: 44.00
OS_K2POWER_DIOPTERS: 42.75

## 2025-02-10 ASSESSMENT — REFRACTION_AUTOREFRACTION
OD_CYLINDER: +0.50
OS_SPHERE: +0.00
OS_CYLINDER: +0.75
OS_AXIS: 173
OD_SPHERE: +0.25
OD_AXIS: 028

## 2025-02-10 ASSESSMENT — VISUAL ACUITY
OD_BCVA: 20/25+2
OS_BCVA: 20/25-1

## 2025-02-10 ASSESSMENT — MACULA - EPIRETINAL MEMBRANE (ERM)
OS_ERM: T
OD_ERM: T

## 2025-02-10 ASSESSMENT — CONFRONTATIONAL VISUAL FIELD TEST (CVF)
OS_FINDINGS: FULL
OD_FINDINGS: FULL

## 2025-03-17 ENCOUNTER — DOCTOR'S OFFICE (OUTPATIENT)
Dept: URBAN - NONMETROPOLITAN AREA CLINIC 1 | Facility: CLINIC | Age: 80
Setting detail: OPHTHALMOLOGY
End: 2025-03-17
Payer: COMMERCIAL

## 2025-03-17 DIAGNOSIS — H35.373: ICD-10-CM

## 2025-03-17 DIAGNOSIS — H35.3131: ICD-10-CM

## 2025-03-17 DIAGNOSIS — H43.813: ICD-10-CM

## 2025-03-17 DIAGNOSIS — E11.3293: ICD-10-CM

## 2025-03-17 DIAGNOSIS — H04.123: ICD-10-CM

## 2025-03-17 PROCEDURE — 83861 MICROFLUID ANALY TEARS: CPT | Performed by: OPHTHALMOLOGY

## 2025-03-17 PROCEDURE — 92134 CPTRZ OPH DX IMG PST SGM RTA: CPT | Performed by: OPHTHALMOLOGY

## 2025-03-17 PROCEDURE — 99213 OFFICE O/P EST LOW 20 MIN: CPT | Performed by: OPHTHALMOLOGY

## 2025-03-17 ASSESSMENT — REFRACTION_MANIFEST
OS_ADD: +2.50
OS_CYLINDER: -0.75
OD_SPHERE: PLANO
OD_CYLINDER: -0.50
OS_SPHERE: PLANO
OD_AXIS: 135
OD_VA1: 20/20-1
OS_AXIS: 075
OS_VA1: 20/25-2
OD_VA2: 20/20-1
OD_ADD: +2.50
OS_VA2: 20/25-2

## 2025-03-17 ASSESSMENT — REFRACTION_CURRENTRX
OD_CYLINDER: 0.00
OD_OVR_VA: 20/
OD_AXIS: 180
OS_CYLINDER: -0.50
OD_SPHERE: +2.50
OS_AXIS: 149
OS_SPHERE: +2.25
OS_OVR_VA: 20/

## 2025-03-17 ASSESSMENT — KERATOMETRY
OD_K1POWER_DIOPTERS: 43.50
OS_K2POWER_DIOPTERS: 42.75
OD_AXISANGLE_DEGREES: 65
OD_K2POWER_DIOPTERS: 44.00
OS_AXISANGLE_DEGREES: 173
OS_K1POWER_DIOPTERS: 43.25

## 2025-03-17 ASSESSMENT — CONFRONTATIONAL VISUAL FIELD TEST (CVF)
OS_FINDINGS: FULL
OD_FINDINGS: FULL

## 2025-03-17 ASSESSMENT — VISUAL ACUITY
OS_BCVA: 20/25-2
OD_BCVA: 20/25+2

## 2025-03-17 ASSESSMENT — REFRACTION_AUTOREFRACTION
OD_AXIS: 028
OD_SPHERE: +0.25
OS_CYLINDER: +0.75
OS_SPHERE: +0.00
OS_AXIS: 173
OD_CYLINDER: +0.50

## 2025-05-05 ENCOUNTER — DOCTOR'S OFFICE (OUTPATIENT)
Dept: URBAN - NONMETROPOLITAN AREA CLINIC 1 | Facility: CLINIC | Age: 80
Setting detail: OPHTHALMOLOGY
End: 2025-05-05
Payer: COMMERCIAL

## 2025-05-05 DIAGNOSIS — H43.813: ICD-10-CM

## 2025-05-05 DIAGNOSIS — E11.3293: ICD-10-CM

## 2025-05-05 DIAGNOSIS — H35.373: ICD-10-CM

## 2025-05-05 DIAGNOSIS — H35.3131: ICD-10-CM

## 2025-05-05 PROCEDURE — 99213 OFFICE O/P EST LOW 20 MIN: CPT | Performed by: OPHTHALMOLOGY

## 2025-05-05 PROCEDURE — 92134 CPTRZ OPH DX IMG PST SGM RTA: CPT | Performed by: OPHTHALMOLOGY

## 2025-05-05 ASSESSMENT — KERATOMETRY
OD_K1POWER_DIOPTERS: 43.50
OS_AXISANGLE_DEGREES: 173
OS_K2POWER_DIOPTERS: 42.75
OD_AXISANGLE_DEGREES: 65
OS_K1POWER_DIOPTERS: 43.25
OD_K2POWER_DIOPTERS: 44.00

## 2025-05-05 ASSESSMENT — REFRACTION_CURRENTRX
OS_AXIS: 149
OD_AXIS: 180
OS_SPHERE: +2.25
OS_OVR_VA: 20/
OS_CYLINDER: -0.50
OD_OVR_VA: 20/
OD_SPHERE: +2.50
OD_CYLINDER: 0.00

## 2025-05-05 ASSESSMENT — REFRACTION_AUTOREFRACTION
OS_SPHERE: +0.00
OD_SPHERE: +0.25
OS_CYLINDER: +0.75
OD_AXIS: 028
OD_CYLINDER: +0.50
OS_AXIS: 173

## 2025-05-05 ASSESSMENT — VISUAL ACUITY
OS_BCVA: 20/20
OD_BCVA: 20/20

## 2025-05-05 ASSESSMENT — REFRACTION_MANIFEST
OS_SPHERE: PLANO
OD_AXIS: 135
OD_SPHERE: PLANO
OS_VA2: 20/25-2
OD_VA1: 20/20-1
OS_ADD: +2.50
OD_VA2: 20/20-1
OD_CYLINDER: -0.50
OS_CYLINDER: -0.75
OD_ADD: +2.50
OS_VA1: 20/25-2
OS_AXIS: 075

## 2025-05-05 ASSESSMENT — CONFRONTATIONAL VISUAL FIELD TEST (CVF)
OD_FINDINGS: FULL
OS_FINDINGS: FULL

## 2025-06-02 ENCOUNTER — DOCTOR'S OFFICE (OUTPATIENT)
Dept: URBAN - NONMETROPOLITAN AREA CLINIC 1 | Facility: CLINIC | Age: 80
Setting detail: OPHTHALMOLOGY
End: 2025-06-02
Payer: COMMERCIAL

## 2025-06-02 DIAGNOSIS — H35.3131: ICD-10-CM

## 2025-06-02 DIAGNOSIS — H43.813: ICD-10-CM

## 2025-06-02 DIAGNOSIS — H35.373: ICD-10-CM

## 2025-06-02 DIAGNOSIS — E11.3293: ICD-10-CM

## 2025-06-02 PROCEDURE — 92134 CPTRZ OPH DX IMG PST SGM RTA: CPT | Performed by: OPHTHALMOLOGY

## 2025-06-02 PROCEDURE — 92012 INTRM OPH EXAM EST PATIENT: CPT | Performed by: OPHTHALMOLOGY

## 2025-06-02 ASSESSMENT — KERATOMETRY
OS_K2POWER_DIOPTERS: 42.75
OD_K2POWER_DIOPTERS: 44.00
OD_K1POWER_DIOPTERS: 43.50
OS_AXISANGLE_DEGREES: 173
OS_K1POWER_DIOPTERS: 43.25
OD_AXISANGLE_DEGREES: 65

## 2025-06-02 ASSESSMENT — REFRACTION_MANIFEST
OS_VA2: 20/25-2
OD_ADD: +2.50
OS_CYLINDER: -0.75
OD_AXIS: 135
OS_SPHERE: PLANO
OS_VA1: 20/25-2
OD_VA1: 20/20-1
OD_SPHERE: PLANO
OD_CYLINDER: -0.50
OS_ADD: +2.50
OD_VA2: 20/20-1
OS_AXIS: 075

## 2025-06-02 ASSESSMENT — VISUAL ACUITY
OS_BCVA: 20/25+1
OD_BCVA: 20/30+2

## 2025-06-02 ASSESSMENT — REFRACTION_CURRENTRX
OS_SPHERE: +2.25
OD_SPHERE: +2.50
OD_AXIS: 180
OD_CYLINDER: 0.00
OS_OVR_VA: 20/
OS_CYLINDER: -0.50
OS_AXIS: 149
OD_OVR_VA: 20/

## 2025-06-02 ASSESSMENT — REFRACTION_AUTOREFRACTION
OD_SPHERE: +0.25
OS_SPHERE: +0.00
OD_CYLINDER: +0.50
OS_AXIS: 173
OS_CYLINDER: +0.75
OD_AXIS: 028

## 2025-06-02 ASSESSMENT — CONFRONTATIONAL VISUAL FIELD TEST (CVF)
OD_FINDINGS: FULL
OS_FINDINGS: FULL

## 2025-06-17 ENCOUNTER — RX ONLY (RX ONLY)
Age: 80
End: 2025-06-17

## 2025-06-17 RX ORDER — BROMFENAC SODIUM 0.7 MG/ML
SOLUTION/ DROPS OPHTHALMIC
Qty: 30 | Refills: 3 | Status: ACTIVE | OUTPATIENT

## 2025-07-14 ENCOUNTER — DOCTOR'S OFFICE (OUTPATIENT)
Dept: URBAN - NONMETROPOLITAN AREA CLINIC 1 | Facility: CLINIC | Age: 80
Setting detail: OPHTHALMOLOGY
End: 2025-07-14
Payer: COMMERCIAL

## 2025-07-14 DIAGNOSIS — H35.3131: ICD-10-CM

## 2025-07-14 DIAGNOSIS — H43.813: ICD-10-CM

## 2025-07-14 DIAGNOSIS — E11.3293: ICD-10-CM

## 2025-07-14 DIAGNOSIS — H35.373: ICD-10-CM

## 2025-07-14 PROCEDURE — 99213 OFFICE O/P EST LOW 20 MIN: CPT | Performed by: OPHTHALMOLOGY

## 2025-07-14 PROCEDURE — 92134 CPTRZ OPH DX IMG PST SGM RTA: CPT | Performed by: OPHTHALMOLOGY

## 2025-07-14 ASSESSMENT — KERATOMETRY
OS_K2POWER_DIOPTERS: 42.75
OS_AXISANGLE_DEGREES: 173
OD_AXISANGLE_DEGREES: 65
OS_K1POWER_DIOPTERS: 43.25
OD_K1POWER_DIOPTERS: 43.50
OD_K2POWER_DIOPTERS: 44.00

## 2025-07-14 ASSESSMENT — REFRACTION_CURRENTRX
OD_OVR_VA: 20/
OS_CYLINDER: -0.50
OD_SPHERE: +2.50
OD_CYLINDER: 0.00
OS_OVR_VA: 20/
OS_AXIS: 149
OD_AXIS: 180
OS_SPHERE: +2.25

## 2025-07-14 ASSESSMENT — VISUAL ACUITY
OS_BCVA: 20/30-1
OD_BCVA: 20/30+2

## 2025-07-14 ASSESSMENT — REFRACTION_AUTOREFRACTION
OD_AXIS: 028
OD_SPHERE: +0.25
OS_CYLINDER: +0.75
OD_CYLINDER: +0.50
OS_SPHERE: +0.00
OS_AXIS: 173

## 2025-07-14 ASSESSMENT — REFRACTION_MANIFEST
OS_VA1: 20/25-2
OS_ADD: +2.50
OD_VA2: 20/20-1
OS_VA2: 20/25-2
OS_CYLINDER: -0.75
OS_AXIS: 075
OD_SPHERE: PLANO
OD_CYLINDER: -0.50
OD_AXIS: 135
OS_SPHERE: PLANO
OD_ADD: +2.50
OD_VA1: 20/20-1

## 2025-07-14 ASSESSMENT — CONFRONTATIONAL VISUAL FIELD TEST (CVF)
OS_FINDINGS: FULL
OD_FINDINGS: FULL

## (undated) DEVICE — UTILITY MARKER,BLACK WITH LABELS: Brand: DEVON

## (undated) DEVICE — PLASTIC ADHESIVE BANDAGE: Brand: CURITY

## (undated) DEVICE — GLOVE SRG BIOGEL 8

## (undated) DEVICE — NEEDLE BLUNT 18 G X 1 1/2IN

## (undated) DEVICE — SYRINGE 3ML LL

## (undated) DEVICE — DRAPE SHEET THREE QUARTER

## (undated) DEVICE — NEEDLE 18 G X 1 1/2

## (undated) DEVICE — SURGIFOAM 7 X 12 SPONGE ABS

## (undated) DEVICE — INTENDED FOR TISSUE SEPARATION, AND OTHER PROCEDURES THAT REQUIRE A SHARP SURGICAL BLADE TO PUNCTURE OR CUT.: Brand: BARD-PARKER SAFETY BLADES SIZE 15, STERILE

## (undated) DEVICE — GAUZE SPONGES,USP TYPE VII GAUZE, 12 PLY: Brand: CURITY

## (undated) DEVICE — TRAY EPID CONT PERIFIX 18G X 3.5IN 10ML CLSD TIP

## (undated) DEVICE — STERILE LATEX POWDER-FREE SURGICAL GLOVESWITH NITRILE COATING: Brand: PROTEXIS

## (undated) DEVICE — DRAPE TOWEL: Brand: CONVERTORS

## (undated) DEVICE — GAUZE SPONGES,16 PLY: Brand: CURITY

## (undated) DEVICE — NEEDLE 25G X 1 1/2

## (undated) DEVICE — PENCIL ELECTROSURG E-Z CLEAN -0035H

## (undated) DEVICE — GLOVE SRG LF STRL BGL SKNSNS 7.5 PF

## (undated) DEVICE — NEEDLE SPINAL 22G X 3.5IN  QUINCKE

## (undated) DEVICE — Device

## (undated) DEVICE — NEEDLE SPINAL 25G X 3.5 IN QUINCKE

## (undated) DEVICE — ELECTRODE BLADE MOD  E-Z CLEAN 6.5IN -0014M

## (undated) DEVICE — SKIN MARKER DUAL TIP WITH RULER CAP, FLEXIBLE RULER AND LABELS: Brand: DEVON

## (undated) DEVICE — CHLORAPREP HI-LITE 10.5ML ORANGE

## (undated) DEVICE — SYRINGE 10ML LL

## (undated) DEVICE — HEMOSTATIC MATRIX SURGIFLO 8ML W/THROMBIN

## (undated) DEVICE — SYRINGE 5ML LL

## (undated) DEVICE — SPONGE PVP SCRUB WING STERILE

## (undated) DEVICE — TIBURON SPLIT SHEET: Brand: CONVERTORS

## (undated) DEVICE — NEEDLE 18 G X 1 1/2 SAFETY

## (undated) DEVICE — SNAP KOVER: Brand: UNBRANDED

## (undated) DEVICE — DRAPE MICROSCOPE OPMI PENTERO

## (undated) DEVICE — TOOL 15BA50 LEGEND 15CM 5MM BA: Brand: MIDAS REX™

## (undated) DEVICE — DISPOSABLE EQUIPMENT COVER: Brand: SMALL TOWEL DRAPE

## (undated) DEVICE — BETHLEHEM UNIVERSAL SPINE, KIT: Brand: CARDINAL HEALTH

## (undated) DEVICE — IV EXTENSION TUBING SMALL BORE

## (undated) DEVICE — DRAPE SHEET X-LG

## (undated) DEVICE — SUT VICRYL PLUS 3-0 RB-1 CR/8 18 IN VCP713D

## (undated) DEVICE — INTENDED FOR TISSUE SEPARATION, AND OTHER PROCEDURES THAT REQUIRE A SHARP SURGICAL BLADE TO PUNCTURE OR CUT.: Brand: BARD-PARKER ® CARBON RIB-BACK BLADES

## (undated) DEVICE — PAD GROUNDING IONIC RF DISP

## (undated) DEVICE — PREP SURGICAL PURPREP 26ML

## (undated) DEVICE — GLOVE INDICATOR PI UNDERGLOVE SZ 8.5 BLUE